# Patient Record
Sex: MALE | Race: WHITE | NOT HISPANIC OR LATINO | Employment: FULL TIME | URBAN - METROPOLITAN AREA
[De-identification: names, ages, dates, MRNs, and addresses within clinical notes are randomized per-mention and may not be internally consistent; named-entity substitution may affect disease eponyms.]

---

## 2017-11-14 ENCOUNTER — OFFICE VISIT (OUTPATIENT)
Dept: INTERNAL MEDICINE | Facility: CLINIC | Age: 47
End: 2017-11-14

## 2017-11-14 VITALS
BODY MASS INDEX: 41.75 KG/M2 | WEIGHT: 315 LBS | HEIGHT: 73 IN | TEMPERATURE: 98.2 F | SYSTOLIC BLOOD PRESSURE: 148 MMHG | DIASTOLIC BLOOD PRESSURE: 82 MMHG

## 2017-11-14 DIAGNOSIS — R11.0 NAUSEA: ICD-10-CM

## 2017-11-14 DIAGNOSIS — H53.8 BLURRY VISION, BILATERAL: ICD-10-CM

## 2017-11-14 DIAGNOSIS — R42 DIZZINESS: ICD-10-CM

## 2017-11-14 DIAGNOSIS — R51.9 WORSENING HEADACHES: ICD-10-CM

## 2017-11-14 DIAGNOSIS — H47.10 PAPILLEDEMA: Primary | ICD-10-CM

## 2017-11-14 PROCEDURE — 99204 OFFICE O/P NEW MOD 45 MIN: CPT | Performed by: INTERNAL MEDICINE

## 2017-11-14 RX ORDER — LISINOPRIL 20 MG/1
20 TABLET ORAL DAILY
COMMUNITY
End: 2018-07-17

## 2017-11-14 RX ORDER — ONDANSETRON 4 MG/1
4 TABLET, ORALLY DISINTEGRATING ORAL EVERY 8 HOURS PRN
Qty: 42 TABLET | Refills: 0 | Status: SHIPPED | OUTPATIENT
Start: 2017-11-14 | End: 2018-04-18

## 2017-11-14 RX ORDER — MECLIZINE HYDROCHLORIDE 25 MG/1
25 TABLET ORAL 3 TIMES DAILY PRN
Qty: 21 TABLET | Refills: 2 | Status: SHIPPED | OUTPATIENT
Start: 2017-11-14 | End: 2018-04-18

## 2017-11-14 NOTE — PATIENT INSTRUCTIONS
Magnetic Resonance Imaging  Magnetic resonance imaging (MRI) is an imaging test that produces clear digital pictures of the inside of your body without using X-rays. The MRI scanner uses radio waves and a magnetic field to create the images. The MRI pictures may provide different details than images obtained through X-rays, CT scans, or ultrasounds. Contrast material may be injected to make MRI images even more clear. In a standard MRI scanner, the area of your body being studied will be in the center opening of the scanner. In open MRI scanners, the scanner does not entirely surround your body.   LET YOUR HEALTH CARE PROVIDER KNOW ABOUT:  · Previous surgeries you have had.  · Any metal you may have in your body. The magnet used in MRI can cause metal objects in your body to move. This includes:    A pacemaker or any other implants, such as an implanted neurostimulator, a metallic ear implant, or a metallic object within the eye socket.    Metal splinters in your body.    Any bullet fragments.    A port for delivering insulin or chemotherapy.  · Any tattoos. Some red dyes contain iron which is sometimes a problem.  · If you are pregnant or think you may be pregnant.  · If you are breastfeeding.  · If you are afraid of cramped spaces (claustrophobic). If claustrophobia is a problem, it usually can be relieved with medicines or the use of the open MRI scanner.  · Any allergies you have.  · All medicines you are taking, including vitamins, herbs, eye drops, creams, and over-the-counter medicines.  RISKS AND COMPLICATIONS   Generally, MRI is a safe procedure. However, problems can occur and include:  · If a metal implant is present but is undetected, it may be affected by the strong magnetic field. In addition, if the implant is close to the examination site, it may be hard to get high-quality images.  · If you are pregnant:    MRI generally should be avoided during the first three months of pregnancy. It is not known  what effects the MRI may have on a fetus. Ultrasound is preferred at this time unless a serious condition is suspected that is best studied by MRI. MRI should be considered if there is a substantial risk of missing the correct diagnosis if MRI is not done.  · If you are breastfeeding:    You should inform your health care provider and ask how to proceed. You may pump breast milk before the exam for use until the contrast material, if used, has cleared from the body.  BEFORE THE PROCEDURE   · You will be asked to remove all metal, including:    Your watch, jewelry, and other metal objects.    Some makeup also contains traces of metal and may need to be removed.    Braces and fillings normally are not a problem.  PROCEDURE  · You may be given earplugs or headphones to listen to music. The MRI scanner can be noisy.  · You may be injected with contrast material.  · The standard MRI is done in a long, magnetic chamber. You will lie down on a platform that slides into the magnetic chamber. Once inside, you will still be able to talk to the person performing the test. The open MRI scanner is open on at least one side of the scanner.  · You will be asked to hold very still. You will be told when you can shift position. You may have to wait a few minutes to make sure the images are readable.  AFTER THE PROCEDURE   · You may resume normal activities right away.  · If you were given contrast material, it will pass naturally through your body within a day.  · A person experienced in MRI (radiologist) will analyze the results and send a report to your health care provider, along with an explanation of the results.     This information is not intended to replace advice given to you by your health care provider. Make sure you discuss any questions you have with your health care provider.     Document Released: 12/15/2001 Document Revised: 01/08/2016 Document Reviewed: 02/12/2015  Elsevier Interactive Patient Education ©2017  [employer]

## 2017-11-14 NOTE — PROGRESS NOTES
Subjective   Catracho Sahu is a 47 y.o. male here to establish care for papilledema noted by optometry. He has also had dizziness, blurry vision R>L, headaches, nausea which have been getting progressively worse but have been present for months. He went to ED recently and no imaging was done though blood work was unrevealing. He feels most dizzy when turning while walking. He feels constantly nauseous but has not vomited. He went to optometrist for blurry vision and papilledema was noted on right eye (exam scanned to chart) and a MRI and LP was ordered but he was directed to PCP to order those.    Review of Systems   Constitutional: Negative.    Eyes: Negative.    Respiratory: Negative.    Cardiovascular: Negative.    Gastrointestinal: Positive for nausea. Negative for vomiting.   Endocrine: Negative.    Genitourinary: Negative.    Musculoskeletal: Negative.    Skin: Negative.    Allergic/Immunologic: Negative.    Neurological: Positive for dizziness, light-headedness and headaches. Negative for seizures, syncope, facial asymmetry, speech difficulty, weakness and numbness.   Hematological: Negative.    Psychiatric/Behavioral: Negative.        Past Medical History:   Diagnosis Date   • Fractures    • Hypertension      Family History   Problem Relation Age of Onset   • Migraines Mother    • Hypertension Father    • Hypertension Paternal Aunt      History reviewed. No pertinent surgical history.  Social History     Social History   • Marital status: Single     Spouse name: N/A   • Number of children: N/A   • Years of education: N/A     Occupational History   • Not on file.     Social History Main Topics   • Smoking status: Never Smoker   • Smokeless tobacco: Never Used   • Alcohol use No   • Drug use: No   • Sexual activity: Not on file     Other Topics Concern   • Not on file     Social History Narrative   • No narrative on file         Current Outpatient Prescriptions:   •  lisinopril (PRINIVIL,ZESTRIL) 20 MG  "tablet, Take 20 mg by mouth Daily., Disp: , Rfl:     Objective   /82 (BP Location: Right arm, Patient Position: Sitting, Cuff Size: Large Adult)  Temp 98.2 °F (36.8 °C) (Temporal Artery )   Ht 73\" (185.4 cm)  Wt (!) 472 lb 12.8 oz (214 kg)  BMI 62.38 kg/m2  Physical Exam   Constitutional: He is oriented to person, place, and time. He appears well-developed and well-nourished.   HENT:   Head: Normocephalic and atraumatic.   Nose: Nose normal.   Eyes: Conjunctivae are normal. Pupils are equal, round, and reactive to light.   Cardiovascular: Normal rate, regular rhythm and normal heart sounds.  Exam reveals no gallop and no friction rub.    No murmur heard.  Pulmonary/Chest: Effort normal and breath sounds normal. He has no wheezes.   Musculoskeletal:   Normal gait and station   Neurological: He is alert and oriented to person, place, and time. He has normal strength. No cranial nerve deficit or sensory deficit. Gait normal.   Skin: Skin is warm and dry.   Psychiatric: He has a normal mood and affect. His behavior is normal. Judgment and thought content normal.   Vitals reviewed.      Assessment/Plan   Catracho was seen today for establish care for papilledema. His sx are a chronic slowly worsening problem and he has no red flag sx that require emergent MRI, though I did order it as STAT (see below).    Diagnoses and all orders for this visit:    Papilledema  -noted on exam by optometry  -ddx increased intracranial pressure, pseudotumor cerebri, get MRI to evaluate. The MRI is problematic due to pt's size. His waist circumference limits where he can go and although MRI was ordered as STAT (wanted this week) it cannot be done until next week at a place in town and may not be able to to be done at all. CT was also considered but same problem. This MRI is first available. Told pt if blurry vision, nausea, dizziness, HA gets worse to go to ED as that is very concerning, at least while we are waiting on " MRI    Blurry vision, bilateral  -     MRI Brain With & Without Contrast    Worsening headaches  -     MRI Brain With & Without Contrast    Nausea  -     MRI Brain With & Without Contrast  -     ondansetron ODT (ZOFRAN ODT) 4 MG disintegrating tablet; Take 1 tablet by mouth Every 8 (Eight) Hours As Needed for Nausea or Vomiting.    Dizziness  -     MRI Brain With & Without Contrast  -     meclizine (ANTIVERT) 25 MG tablet; Take 1 tablet by mouth 3 (Three) Times a Day As Needed for dizziness.

## 2017-11-21 ENCOUNTER — TELEPHONE (OUTPATIENT)
Dept: INTERNAL MEDICINE | Facility: CLINIC | Age: 47
End: 2017-11-21

## 2017-11-22 ENCOUNTER — TELEPHONE (OUTPATIENT)
Dept: INTERNAL MEDICINE | Facility: CLINIC | Age: 47
End: 2017-11-22

## 2017-11-22 DIAGNOSIS — G93.5 CHIARI MALFORMATION TYPE I (HCC): Primary | ICD-10-CM

## 2017-11-22 DIAGNOSIS — R42 DIZZINESS: ICD-10-CM

## 2017-11-22 NOTE — TELEPHONE ENCOUNTER
Spoke to patient. He stated he went to [facility] on the 20th. I called [facility] and the report is in [facility]. The have contacted their reading department to have them release the pre alegria so they can fax it to Dr. Tan.     Taqueria advising patient. nh

## 2017-11-27 ENCOUNTER — TELEPHONE (OUTPATIENT)
Dept: INTERNAL MEDICINE | Facility: CLINIC | Age: 47
End: 2017-11-27

## 2017-11-27 NOTE — TELEPHONE ENCOUNTER
Pt is calling just to verify we received the final report of his MRI and if there were any changes from the preliminary. Please call pt and advise. Pt said if no answer okay to leave a detailed message.

## 2018-04-18 ENCOUNTER — DOCUMENTATION (OUTPATIENT)
Dept: BARIATRICS/WEIGHT MGMT | Facility: CLINIC | Age: 48
End: 2018-04-18

## 2018-04-18 DIAGNOSIS — R10.13 DYSPEPSIA: ICD-10-CM

## 2018-04-18 DIAGNOSIS — R06.00 DYSPNEA, UNSPECIFIED TYPE: Primary | ICD-10-CM

## 2018-04-18 DIAGNOSIS — R53.83 FATIGUE, UNSPECIFIED TYPE: ICD-10-CM

## 2018-04-18 RX ORDER — MULTIPLE VITAMINS W/ MINERALS TAB 9MG-400MCG
1 TAB ORAL DAILY
COMMUNITY
End: 2019-05-22

## 2018-04-27 ENCOUNTER — HOSPITAL ENCOUNTER (EMERGENCY)
Facility: HOSPITAL | Age: 48
Discharge: HOME | End: 2018-04-27
Attending: EMERGENCY MEDICINE | Admitting: EMERGENCY MEDICINE

## 2018-04-27 ENCOUNTER — APPOINTMENT (OUTPATIENT)
Dept: GENERAL RADIOLOGY | Facility: HOSPITAL | Age: 48
End: 2018-04-27

## 2018-04-27 ENCOUNTER — APPOINTMENT (OUTPATIENT)
Dept: CT IMAGING | Facility: HOSPITAL | Age: 48
End: 2018-04-27

## 2018-04-27 VITALS
HEIGHT: 73 IN | TEMPERATURE: 98.6 F | HEART RATE: 89 BPM | WEIGHT: 315 LBS | DIASTOLIC BLOOD PRESSURE: 81 MMHG | SYSTOLIC BLOOD PRESSURE: 127 MMHG | BODY MASS INDEX: 41.75 KG/M2 | RESPIRATION RATE: 16 BRPM | OXYGEN SATURATION: 93 %

## 2018-04-27 DIAGNOSIS — R10.9 LEFT FLANK PAIN: Primary | ICD-10-CM

## 2018-04-27 DIAGNOSIS — M75.92 SUPRASPINATUS TENDINITIS, LEFT: ICD-10-CM

## 2018-04-27 LAB
ALBUMIN SERPL-MCNC: 4.4 G/DL (ref 3.2–4.8)
ALBUMIN/GLOB SERPL: 1.4 G/DL (ref 1.5–2.5)
ALP SERPL-CCNC: 59 U/L (ref 25–100)
ALT SERPL W P-5'-P-CCNC: 38 U/L (ref 7–40)
ANION GAP SERPL CALCULATED.3IONS-SCNC: 8 MMOL/L (ref 3–11)
AST SERPL-CCNC: 32 U/L (ref 0–33)
BACTERIA UR QL AUTO: NORMAL /HPF
BASOPHILS # BLD AUTO: 0.05 10*3/MM3 (ref 0–0.2)
BASOPHILS NFR BLD AUTO: 0.7 % (ref 0–1)
BILIRUB SERPL-MCNC: 0.8 MG/DL (ref 0.3–1.2)
BILIRUB UR QL STRIP: NEGATIVE
BUN BLD-MCNC: 16 MG/DL (ref 9–23)
BUN/CREAT SERPL: 12.3 (ref 7–25)
CALCIUM SPEC-SCNC: 9.2 MG/DL (ref 8.7–10.4)
CHLORIDE SERPL-SCNC: 103 MMOL/L (ref 99–109)
CLARITY UR: CLEAR
CO2 SERPL-SCNC: 27 MMOL/L (ref 20–31)
COLOR UR: YELLOW
CREAT BLD-MCNC: 1.3 MG/DL (ref 0.6–1.3)
DEPRECATED RDW RBC AUTO: 41.9 FL (ref 37–54)
EOSINOPHIL # BLD AUTO: 0.17 10*3/MM3 (ref 0–0.3)
EOSINOPHIL NFR BLD AUTO: 2.5 % (ref 0–3)
ERYTHROCYTE [DISTWIDTH] IN BLOOD BY AUTOMATED COUNT: 13.6 % (ref 11.3–14.5)
GFR SERPL CREATININE-BSD FRML MDRD: 60 ML/MIN/1.73
GLOBULIN UR ELPH-MCNC: 3.2 GM/DL
GLUCOSE BLD-MCNC: 90 MG/DL (ref 70–100)
GLUCOSE UR STRIP-MCNC: NEGATIVE MG/DL
HCT VFR BLD AUTO: 47.1 % (ref 38.9–50.9)
HGB BLD-MCNC: 15.7 G/DL (ref 13.1–17.5)
HGB UR QL STRIP.AUTO: ABNORMAL
HYALINE CASTS UR QL AUTO: NORMAL /LPF
IMM GRANULOCYTES # BLD: 0.01 10*3/MM3 (ref 0–0.03)
IMM GRANULOCYTES NFR BLD: 0.1 % (ref 0–0.6)
KETONES UR QL STRIP: ABNORMAL
LEUKOCYTE ESTERASE UR QL STRIP.AUTO: NEGATIVE
LIPASE SERPL-CCNC: 28 U/L (ref 6–51)
LYMPHOCYTES # BLD AUTO: 1.9 10*3/MM3 (ref 0.6–4.8)
LYMPHOCYTES NFR BLD AUTO: 27.8 % (ref 24–44)
MCH RBC QN AUTO: 28.4 PG (ref 27–31)
MCHC RBC AUTO-ENTMCNC: 33.3 G/DL (ref 32–36)
MCV RBC AUTO: 85.2 FL (ref 80–99)
MONOCYTES # BLD AUTO: 0.45 10*3/MM3 (ref 0–1)
MONOCYTES NFR BLD AUTO: 6.6 % (ref 0–12)
NEUTROPHILS # BLD AUTO: 4.26 10*3/MM3 (ref 1.5–8.3)
NEUTROPHILS NFR BLD AUTO: 62.4 % (ref 41–71)
NITRITE UR QL STRIP: NEGATIVE
PH UR STRIP.AUTO: <=5 [PH] (ref 5–8)
PLATELET # BLD AUTO: 195 10*3/MM3 (ref 150–450)
PMV BLD AUTO: 10.2 FL (ref 6–12)
POTASSIUM BLD-SCNC: 4 MMOL/L (ref 3.5–5.5)
PROT SERPL-MCNC: 7.6 G/DL (ref 5.7–8.2)
PROT UR QL STRIP: NEGATIVE
RBC # BLD AUTO: 5.53 10*6/MM3 (ref 4.2–5.76)
RBC # UR: NORMAL /HPF
REF LAB TEST METHOD: NORMAL
SODIUM BLD-SCNC: 138 MMOL/L (ref 132–146)
SP GR UR STRIP: 1.02 (ref 1–1.03)
SQUAMOUS #/AREA URNS HPF: NORMAL /HPF
UROBILINOGEN UR QL STRIP: ABNORMAL
WBC NRBC COR # BLD: 6.83 10*3/MM3 (ref 3.5–10.8)
WBC UR QL AUTO: NORMAL /HPF

## 2018-04-27 PROCEDURE — 74176 CT ABD & PELVIS W/O CONTRAST: CPT

## 2018-04-27 PROCEDURE — 81001 URINALYSIS AUTO W/SCOPE: CPT | Performed by: PHYSICIAN ASSISTANT

## 2018-04-27 PROCEDURE — 73030 X-RAY EXAM OF SHOULDER: CPT

## 2018-04-27 PROCEDURE — 83690 ASSAY OF LIPASE: CPT | Performed by: PHYSICIAN ASSISTANT

## 2018-04-27 PROCEDURE — 80053 COMPREHEN METABOLIC PANEL: CPT | Performed by: PHYSICIAN ASSISTANT

## 2018-04-27 PROCEDURE — 99283 EMERGENCY DEPT VISIT LOW MDM: CPT

## 2018-04-27 PROCEDURE — 85025 COMPLETE CBC W/AUTO DIFF WBC: CPT | Performed by: PHYSICIAN ASSISTANT

## 2018-04-27 RX ORDER — DICLOFENAC POTASSIUM 50 MG/1
50 TABLET, FILM COATED ORAL 3 TIMES DAILY
Qty: 15 TABLET | Refills: 0 | Status: SHIPPED | OUTPATIENT
Start: 2018-04-27 | End: 2018-07-12

## 2018-06-13 DIAGNOSIS — R10.13 DYSPEPSIA: ICD-10-CM

## 2018-06-13 DIAGNOSIS — R06.00 DYSPNEA, UNSPECIFIED TYPE: ICD-10-CM

## 2018-06-13 DIAGNOSIS — R53.83 FATIGUE, UNSPECIFIED TYPE: ICD-10-CM

## 2018-07-12 ENCOUNTER — HOSPITAL ENCOUNTER (EMERGENCY)
Facility: HOSPITAL | Age: 48
Discharge: HOME | End: 2018-07-12
Attending: EMERGENCY MEDICINE | Admitting: EMERGENCY MEDICINE

## 2018-07-12 ENCOUNTER — APPOINTMENT (OUTPATIENT)
Dept: GENERAL RADIOLOGY | Facility: HOSPITAL | Age: 48
End: 2018-07-12

## 2018-07-12 VITALS
DIASTOLIC BLOOD PRESSURE: 88 MMHG | SYSTOLIC BLOOD PRESSURE: 144 MMHG | BODY MASS INDEX: 42.66 KG/M2 | HEIGHT: 72 IN | OXYGEN SATURATION: 95 % | HEART RATE: 80 BPM | RESPIRATION RATE: 18 BRPM | TEMPERATURE: 98.5 F | WEIGHT: 315 LBS

## 2018-07-12 DIAGNOSIS — M77.31 HEEL SPUR, RIGHT: ICD-10-CM

## 2018-07-12 DIAGNOSIS — M72.2 PLANTAR FASCIITIS OF RIGHT FOOT: Primary | ICD-10-CM

## 2018-07-12 DIAGNOSIS — I10 UNCONTROLLED HYPERTENSION: ICD-10-CM

## 2018-07-12 PROCEDURE — 73610 X-RAY EXAM OF ANKLE: CPT

## 2018-07-12 PROCEDURE — 99283 EMERGENCY DEPT VISIT LOW MDM: CPT

## 2018-07-12 RX ORDER — NAPROXEN 375 MG/1
375 TABLET ORAL 2 TIMES DAILY PRN
Qty: 14 TABLET | Refills: 0 | Status: SHIPPED | OUTPATIENT
Start: 2018-07-12 | End: 2019-03-19

## 2018-07-13 NOTE — DISCHARGE INSTRUCTIONS
Limit weight bearing for three full days.  Work excuse provided to facilitate this.  Cool compresses to your feet.  See a podiatrist about potential shoe inserts.  Until then, insert purchased arch supports.  Antiinflammatories as directed for comfort.  Thank you

## 2018-07-13 NOTE — ED PROVIDER NOTES
Subjective   Patient presents with a complaint of pain to his right inner foot going toward the ankle.  He discovered some discoloration there recently.  He recently began to perform at a job with more ambulation and standing.  He is using shoes without sufficient arch support as well        History provided by:  Patient   used: No    Lower Extremity Issue   Location:  Foot  Injury: no    Foot location:  R foot  Pain details:     Quality:  Aching    Severity:  Mild    Onset quality:  Gradual    Timing:  Intermittent  Chronicity:  New  Dislocation: no    Prior injury to area:  No  Relieved by:  Elevation  Worsened by:  Bearing weight  Ineffective treatments:  None tried  Associated symptoms: swelling    Associated symptoms: no back pain, no numbness and no tingling    Risk factors: obesity        Review of Systems   Musculoskeletal: Negative for back pain.        Superficial variscosities at the inner foot and ankle are broken and tender on the inner portions of both feet.    All other systems reviewed and are negative.      Past Medical History:   Diagnosis Date   • Fractures    • Hypertension    • Sleep apnea        No Known Allergies    Past Surgical History:   Procedure Laterality Date   • COLONOSCOPY  2008   • TONSILLECTOMY  2002       Family History   Problem Relation Age of Onset   • Migraines Mother    • Hypertension Father    • Stroke Father    • Hypertension Paternal Aunt    • Cancer Maternal Grandmother    • Parkinsonism Maternal Grandfather    • Heart attack Paternal Grandmother    • Hypertension Paternal Grandmother    • Dementia Paternal Grandfather    • Stroke Paternal Grandfather        Social History     Social History   • Marital status: Single     Social History Main Topics   • Smoking status: Never Smoker   • Smokeless tobacco: Never Used   • Alcohol use No   • Drug use: No   • Sexual activity: Defer     Other Topics Concern   • Not on file           Objective   Physical Exam    Constitutional: He appears well-developed and well-nourished. No distress.   HENT:   Head: Normocephalic and atraumatic.   Eyes: Pupils are equal, round, and reactive to light.   Neck: Neck supple.   Cardiovascular: Normal rate.    Pulmonary/Chest: Effort normal and breath sounds normal. No respiratory distress.   Musculoskeletal:   Right foot:  There are multiple superficial variscosities at the plantar region of the inner foot that are ruptured with local disccoloration and ecchymosis extending toward the ankle but not quite.  Patient localizes tenderness.  Further inquiry reveals the same manifestation on the opposite but nontender foot.  NV exam is otherwise negative.     Neurological: No sensory deficit. Coordination normal.   Skin: Capillary refill takes less than 2 seconds. No rash noted. He is not diaphoretic. No erythema. No pallor.   Psychiatric: He has a normal mood and affect. His behavior is normal. Judgment and thought content normal.       Procedures           ED Course                  MDM      Final diagnoses:   Plantar fasciitis of right foot   Uncontrolled hypertension   Heel spur, right            Callie Vijaya Thornton, APRN  07/16/18 6092

## 2018-07-17 ENCOUNTER — OFFICE VISIT (OUTPATIENT)
Dept: INTERNAL MEDICINE | Facility: CLINIC | Age: 48
End: 2018-07-17

## 2018-07-17 VITALS
TEMPERATURE: 97.3 F | DIASTOLIC BLOOD PRESSURE: 110 MMHG | BODY MASS INDEX: 42.66 KG/M2 | SYSTOLIC BLOOD PRESSURE: 164 MMHG | WEIGHT: 315 LBS | HEIGHT: 72 IN

## 2018-07-17 DIAGNOSIS — M54.50 CHRONIC LEFT-SIDED LOW BACK PAIN WITHOUT SCIATICA: ICD-10-CM

## 2018-07-17 DIAGNOSIS — Z00.00 HEALTH CARE MAINTENANCE: ICD-10-CM

## 2018-07-17 DIAGNOSIS — I10 BENIGN ESSENTIAL HTN: Primary | ICD-10-CM

## 2018-07-17 DIAGNOSIS — R35.89 POLYURIA: ICD-10-CM

## 2018-07-17 DIAGNOSIS — G89.29 CHRONIC LEFT-SIDED LOW BACK PAIN WITHOUT SCIATICA: ICD-10-CM

## 2018-07-17 LAB
ALBUMIN SERPL-MCNC: 3.99 G/DL (ref 3.2–4.8)
ALBUMIN/GLOB SERPL: 1.5 G/DL (ref 1.5–2.5)
ALP SERPL-CCNC: 58 U/L (ref 25–100)
ALT SERPL W P-5'-P-CCNC: 28 U/L (ref 7–40)
ANION GAP SERPL CALCULATED.3IONS-SCNC: 6 MMOL/L (ref 3–11)
ARTICHOKE IGE QN: 91 MG/DL (ref 0–130)
AST SERPL-CCNC: 24 U/L (ref 0–33)
BILIRUB SERPL-MCNC: 0.7 MG/DL (ref 0.3–1.2)
BUN BLD-MCNC: 13 MG/DL (ref 9–23)
BUN/CREAT SERPL: 10.7 (ref 7–25)
CALCIUM SPEC-SCNC: 8.5 MG/DL (ref 8.7–10.4)
CHLORIDE SERPL-SCNC: 108 MMOL/L (ref 99–109)
CHOLEST SERPL-MCNC: 145 MG/DL (ref 0–200)
CO2 SERPL-SCNC: 27 MMOL/L (ref 20–31)
CREAT BLD-MCNC: 1.22 MG/DL (ref 0.6–1.3)
GFR SERPL CREATININE-BSD FRML MDRD: 64 ML/MIN/1.73
GLOBULIN UR ELPH-MCNC: 2.7 GM/DL
GLUCOSE BLD-MCNC: 99 MG/DL (ref 70–100)
HBA1C MFR BLD: 5.7 % (ref 4.8–5.6)
HDLC SERPL-MCNC: 46 MG/DL (ref 40–60)
POTASSIUM BLD-SCNC: 4.1 MMOL/L (ref 3.5–5.5)
PROT SERPL-MCNC: 6.7 G/DL (ref 5.7–8.2)
SODIUM BLD-SCNC: 141 MMOL/L (ref 132–146)
TRIGL SERPL-MCNC: 96 MG/DL (ref 0–150)

## 2018-07-17 PROCEDURE — 83036 HEMOGLOBIN GLYCOSYLATED A1C: CPT | Performed by: INTERNAL MEDICINE

## 2018-07-17 PROCEDURE — 99214 OFFICE O/P EST MOD 30 MIN: CPT | Performed by: INTERNAL MEDICINE

## 2018-07-17 PROCEDURE — 80061 LIPID PANEL: CPT | Performed by: INTERNAL MEDICINE

## 2018-07-17 PROCEDURE — 36415 COLL VENOUS BLD VENIPUNCTURE: CPT | Performed by: INTERNAL MEDICINE

## 2018-07-17 PROCEDURE — 80053 COMPREHEN METABOLIC PANEL: CPT | Performed by: INTERNAL MEDICINE

## 2018-07-17 RX ORDER — LISINOPRIL 40 MG/1
40 TABLET ORAL DAILY
Qty: 30 TABLET | Refills: 5 | Status: SHIPPED | OUTPATIENT
Start: 2018-07-17 | End: 2020-12-23 | Stop reason: SDUPTHER

## 2018-07-17 NOTE — PROGRESS NOTES
"Subjective   Catracho Sahu is a 47 y.o. male here for follow-up recent ED visit for uncontrolled HTN and foot pain. Was told BP was very high and he admits running out of lisinopril. They told him he has plantar fasciitis and there is some concern for poor circulation or diabetes. He does endorse polyuria and polydipsia. Never had DM before. Has some left sided low back pain and spasms for years now. Especially bad if lying on stomach and tries to get up from there, feels stabbing.       The following portions of the patient's history were reviewed and updated as appropriate: allergies, current medications, past medical history, past social history and problem list.    Review of Systems:  General: negative  CV: negative  Respiratory: negative  Neuro: negative  Endo: polyuria, polydipsia  MSK: back pain    Objective   BP (!) 164/110 (BP Location: Left arm, Patient Position: Sitting, Cuff Size: Large Adult)   Temp 97.3 °F (36.3 °C) (Temporal Artery )   Ht 182.9 cm (72\")   Wt (!) 210 kg (463 lb)   BMI 62.79 kg/m²     Physical Exam   Constitutional: He is oriented to person, place, and time. He appears well-developed and well-nourished.   Cardiovascular: Normal rate, regular rhythm and normal heart sounds.    Pulmonary/Chest: Effort normal and breath sounds normal. He has no wheezes. He has no rales.   Musculoskeletal:   Non-pitting edema in feet bilaterally   Neurological: He is alert and oriented to person, place, and time.   Skin: Skin is warm and dry.   Psychiatric: He has a normal mood and affect. His behavior is normal. Thought content normal.   Vitals reviewed.      Assessment/Plan   Catracho was seen today for hypertension.    Diagnoses and all orders for this visit:    Benign essential HTN  -     lisinopril (PRINIVIL,ZESTRIL) 40 MG tablet; Take 1 tablet by mouth Daily.  -     Increased dose, will RTC 1 week for BP check and go over labs    Chronic left-sided back pain  -likely related to inactivity, " obesity, and probable arthritis  -rec stretching exercises, losing weight    Polyuria  -     Hemoglobin A1c    Health care maintenance  -     Hemoglobin A1c  -     Comprehensive Metabolic Panel  -     Lipid Panel

## 2018-07-17 NOTE — PATIENT INSTRUCTIONS

## 2018-07-27 ENCOUNTER — OFFICE VISIT (OUTPATIENT)
Dept: INTERNAL MEDICINE | Facility: CLINIC | Age: 48
End: 2018-07-27

## 2018-07-27 VITALS
DIASTOLIC BLOOD PRESSURE: 100 MMHG | SYSTOLIC BLOOD PRESSURE: 162 MMHG | BODY MASS INDEX: 42.66 KG/M2 | TEMPERATURE: 97.3 F | HEIGHT: 72 IN | WEIGHT: 315 LBS

## 2018-07-27 DIAGNOSIS — M72.2 PLANTAR FASCIITIS: ICD-10-CM

## 2018-07-27 DIAGNOSIS — E66.01 MORBID OBESITY WITH BMI OF 60.0-69.9, ADULT (HCC): ICD-10-CM

## 2018-07-27 DIAGNOSIS — R73.03 PRE-DIABETES: ICD-10-CM

## 2018-07-27 DIAGNOSIS — I10 BENIGN ESSENTIAL HTN: Primary | ICD-10-CM

## 2018-07-27 PROCEDURE — 99214 OFFICE O/P EST MOD 30 MIN: CPT | Performed by: INTERNAL MEDICINE

## 2018-07-27 RX ORDER — HYDROCHLOROTHIAZIDE 25 MG/1
25 TABLET ORAL DAILY
Qty: 30 TABLET | Refills: 2 | OUTPATIENT
Start: 2018-07-27 | End: 2019-05-22

## 2018-07-27 NOTE — PROGRESS NOTES
"Subjective   Catracho Sahu is a 47 y.o. male here for follow-up HTN, newly diagnosed pre-DM, obesity, plantar fasciitis. HTN: taking lisinopril as prescribed, doesn't take BP at home. Does feel anxious today to go over labs. Pre-DM: new diagnosis. Never had DM before. Denies any sx of DM. Doesn't eat that many sweets and mainly drinks crystal light or diet Coke. Plantar fasciitis: in the right foot, saw specialist. Bought some compression socks which have been helping.      The following portions of the patient's history were reviewed and updated as appropriate: allergies, current medications, past medical history, past social history and problem list.    Review of Systems:  General: negative  CV: negative  Respiratory: negative  Endo: negative  MSK: b/l foot pain    Objective   /100 (BP Location: Left arm, Patient Position: Sitting, Cuff Size: Large Adult)   Temp 97.3 °F (36.3 °C) (Temporal Artery )   Ht 182.9 cm (72\")   Wt (!) 208 kg (458 lb 6.4 oz)   BMI 62.17 kg/m²     Physical Exam   Constitutional: He is oriented to person, place, and time. He appears well-developed and well-nourished.   Pulmonary/Chest: Effort normal.   Neurological: He is alert and oriented to person, place, and time.   Skin: Skin is warm and dry.   Psychiatric: He has a normal mood and affect. His behavior is normal. Judgment and thought content normal.   Vitals reviewed.      Assessment/Plan   Catracho was seen today for hypertension.    Diagnoses and all orders for this visit:    Benign essential HTN  -     hydrochlorothiazide (HYDRODIURIL) 25 MG tablet; Take 1 tablet by mouth Daily.  -     Add-on to lisinopril  -     Encouraged weight loss    Pre-diabetes  -discussed diet, limiting carbs, sugary drinks, concentrated sweets to prevent diabetes    Morbid obesity with BMI of 60.0-69.9, adult (CMS/[facility])  -complicates all aspects of care    Plantar fasciitis  -seeing specialist         "

## 2018-08-21 ENCOUNTER — TELEPHONE (OUTPATIENT)
Dept: INTERNAL MEDICINE | Facility: CLINIC | Age: 48
End: 2018-08-21

## 2018-08-21 NOTE — TELEPHONE ENCOUNTER
"Patient sts he is taking his bp meds and the \"water pill\" however his bp is running 185/105. Asking if you want to see him or what you suggest?  "

## 2018-08-22 NOTE — TELEPHONE ENCOUNTER
Please call the patient to schedule to come in for blood pressure. (see below) Also ask him to bring his cuff in so we can compare.

## 2018-08-22 NOTE — TELEPHONE ENCOUNTER
He needs to come in for a BP check to compare his cuff to ours. I just started HCTZ and his BP is higher than before I did that so I'm thinking his cuff is wrong. Probably doesn't have the right size for his arm girth.

## 2018-10-03 ENCOUNTER — APPOINTMENT (OUTPATIENT)
Dept: GENERAL RADIOLOGY | Facility: HOSPITAL | Age: 48
End: 2018-10-03

## 2018-10-03 ENCOUNTER — HOSPITAL ENCOUNTER (EMERGENCY)
Facility: HOSPITAL | Age: 48
Discharge: HOME | End: 2018-10-03
Attending: EMERGENCY MEDICINE | Admitting: EMERGENCY MEDICINE

## 2018-10-03 VITALS
WEIGHT: 315 LBS | DIASTOLIC BLOOD PRESSURE: 79 MMHG | OXYGEN SATURATION: 94 % | BODY MASS INDEX: 41.75 KG/M2 | TEMPERATURE: 98.2 F | SYSTOLIC BLOOD PRESSURE: 139 MMHG | HEART RATE: 87 BPM | HEIGHT: 73 IN | RESPIRATION RATE: 18 BRPM

## 2018-10-03 DIAGNOSIS — M25.511 ACUTE PAIN OF RIGHT SHOULDER: ICD-10-CM

## 2018-10-03 DIAGNOSIS — S46.001A INJURY OF RIGHT ROTATOR CUFF, INITIAL ENCOUNTER: Primary | ICD-10-CM

## 2018-10-03 PROCEDURE — 73030 X-RAY EXAM OF SHOULDER: CPT

## 2018-10-03 PROCEDURE — 25010000002 KETOROLAC TROMETHAMINE PER 15 MG: Performed by: NURSE PRACTITIONER

## 2018-10-03 PROCEDURE — 99283 EMERGENCY DEPT VISIT LOW MDM: CPT

## 2018-10-03 PROCEDURE — 96372 THER/PROPH/DIAG INJ SC/IM: CPT

## 2018-10-03 RX ORDER — KETOROLAC TROMETHAMINE 30 MG/ML
30 INJECTION, SOLUTION INTRAMUSCULAR; INTRAVENOUS ONCE
Status: COMPLETED | OUTPATIENT
Start: 2018-10-03 | End: 2018-10-03

## 2018-10-03 RX ORDER — HYDROCODONE BITARTRATE AND ACETAMINOPHEN 5; 325 MG/1; MG/1
1-2 TABLET ORAL EVERY 6 HOURS PRN
Qty: 12 TABLET | Refills: 0 | Status: SHIPPED | OUTPATIENT
Start: 2018-10-03 | End: 2019-03-19

## 2018-10-03 RX ADMIN — KETOROLAC TROMETHAMINE 30 MG: 30 INJECTION, SOLUTION INTRAMUSCULAR; INTRAVENOUS at 15:57

## 2018-10-03 NOTE — ED PROVIDER NOTES
Subjective   Right shoulder pain. Worse with movement. No trauma.     No soa. No cp. No fever.    No abd pain.    Rest makes it better.        Upper Extremity Issue   Location:  Shoulder  Shoulder location:  R shoulder  Injury: no    Pain details:     Quality:  Tearing    Radiates to:  Does not radiate    Severity:  Moderate    Onset quality:  Sudden    Timing:  Constant    Progression:  Unchanged  Handedness:  Right-handed  Dislocation: no    Foreign body present:  No foreign bodies  Prior injury to area:  Yes  Relieved by:  Rest  Worsened by:  Movement  Ineffective treatments:  None tried  Associated symptoms: decreased range of motion    Associated symptoms: no fever, no numbness and no swelling        Review of Systems   Constitutional: Negative for fever.   Respiratory: Negative for shortness of breath.    Cardiovascular: Negative for chest pain.   Gastrointestinal: Negative for abdominal pain.   Musculoskeletal: Negative for neck stiffness.   Neurological: Negative for dizziness, seizures, syncope, speech difficulty, weakness, light-headedness, numbness and headaches.   Psychiatric/Behavioral: Negative for confusion.   All other systems reviewed and are negative.      Past Medical History:   Diagnosis Date   • Fractures    • Hypertension    • Sleep apnea        No Known Allergies    Past Surgical History:   Procedure Laterality Date   • COLONOSCOPY  2008   • TONSILLECTOMY  2002       Family History   Problem Relation Age of Onset   • Migraines Mother    • Hypertension Father    • Stroke Father    • Hypertension Paternal Aunt    • Cancer Maternal Grandmother    • Parkinsonism Maternal Grandfather    • Heart attack Paternal Grandmother    • Hypertension Paternal Grandmother    • Dementia Paternal Grandfather    • Stroke Paternal Grandfather        Social History     Social History   • Marital status:      Social History Main Topics   • Smoking status: Never Smoker   • Smokeless tobacco: Never Used   •  Alcohol use No   • Drug use: No   • Sexual activity: Defer     Other Topics Concern   • Not on file           Objective   Physical Exam   Constitutional: He is oriented to person, place, and time. He appears well-developed and well-nourished.   HENT:   Head: Normocephalic and atraumatic.   Right Ear: External ear normal.   Left Ear: External ear normal.   Nose: Nose normal.   Mouth/Throat: Oropharynx is clear and moist.   Eyes: Pupils are equal, round, and reactive to light. Conjunctivae and EOM are normal.   Neck: Normal range of motion. Neck supple.   Cardiovascular: Normal rate, regular rhythm, normal heart sounds and intact distal pulses.    Pulmonary/Chest: Effort normal and breath sounds normal.   Abdominal: Soft. Bowel sounds are normal.   Musculoskeletal:        Right shoulder: He exhibits decreased range of motion, tenderness and pain. He exhibits no deformity, no spasm, normal pulse and normal strength.   Neurological: He is alert and oriented to person, place, and time.   Skin: Skin is warm and dry.   Psychiatric: He has a normal mood and affect. His behavior is normal. Judgment normal.       Procedures           ED Course        XR Shoulder 2+ View Right   Preliminary Result   Normal examination.       DICTATED:   10/03/2018   EDITED/ls :   10/03/2018                           [facility]      Final diagnoses:   Injury of right rotator cuff, initial encounter   Acute pain of right shoulder            Jonny Chung, FÉLIX  10/03/18 1537

## 2018-11-05 ENCOUNTER — HOSPITAL ENCOUNTER (OUTPATIENT)
Dept: GENERAL RADIOLOGY | Facility: HOSPITAL | Age: 48
Discharge: HOME | End: 2018-11-05
Admitting: INTERNAL MEDICINE

## 2018-11-05 ENCOUNTER — OFFICE VISIT (OUTPATIENT)
Dept: INTERNAL MEDICINE | Facility: CLINIC | Age: 48
End: 2018-11-05

## 2018-11-05 VITALS — BODY MASS INDEX: 42.66 KG/M2 | WEIGHT: 315 LBS | HEIGHT: 72 IN | TEMPERATURE: 97.6 F

## 2018-11-05 DIAGNOSIS — J06.9 ACUTE URI: Primary | ICD-10-CM

## 2018-11-05 LAB
EXPIRATION DATE: NORMAL
FLUAV AG NPH QL: NORMAL
FLUBV AG NPH QL: NORMAL
INTERNAL CONTROL: NORMAL
Lab: NORMAL

## 2018-11-05 PROCEDURE — 87804 INFLUENZA ASSAY W/OPTIC: CPT | Performed by: INTERNAL MEDICINE

## 2018-11-05 PROCEDURE — 99213 OFFICE O/P EST LOW 20 MIN: CPT | Performed by: INTERNAL MEDICINE

## 2018-11-05 PROCEDURE — 71046 X-RAY EXAM CHEST 2 VIEWS: CPT

## 2018-11-05 NOTE — PROGRESS NOTES
"Subjective   Catracho Sahu is a 48 y.o. male here for cough and SOA for 2 days. Chest feels heavy and he is having more SALEH than normal. Dry cough. Body aches as well though no fever, chills. Feels overall ill. No sick contacts.    PMH: reviewed  Meds: reviewed    Review of Systems   Constitutional: Positive for activity change and fatigue. Negative for chills and fever.   HENT: Positive for congestion, postnasal drip, sinus pressure and sore throat. Negative for ear discharge, ear pain, rhinorrhea and sneezing.    Respiratory: Positive for cough and shortness of breath. Negative for wheezing.    Gastrointestinal: Negative.    Musculoskeletal: Positive for myalgias.   Skin: Negative.          Objective   Temp 97.6 °F (36.4 °C) (Temporal Artery )   Ht 182.9 cm (72\")   Wt (!) 207 kg (456 lb 3.2 oz)   BMI 61.87 kg/m²     Physical Exam   Constitutional: He appears well-developed and well-nourished.   HENT:   Nose: Rhinorrhea present. No mucosal edema. Right sinus exhibits no maxillary sinus tenderness and no frontal sinus tenderness. Left sinus exhibits no maxillary sinus tenderness and no frontal sinus tenderness.   Mouth/Throat: Oropharynx is clear and moist and mucous membranes are normal. No oropharyngeal exudate, posterior oropharyngeal edema or posterior oropharyngeal erythema.   Neck: Neck supple.   Pulmonary/Chest: Effort normal and breath sounds normal. No respiratory distress. He has no wheezes.   Lymphadenopathy:     He has no cervical adenopathy.   Skin: Skin is warm and dry.   Vitals reviewed.      Assessment/Plan   Catracho was seen today for generalized body aches.    Diagnoses and all orders for this visit:    Acute URI  -     XR Chest PA & Lateral  -     POC Influenza A / B: negative           "

## 2018-11-06 ENCOUNTER — TELEPHONE (OUTPATIENT)
Dept: INTERNAL MEDICINE | Facility: CLINIC | Age: 48
End: 2018-11-06

## 2018-11-06 NOTE — TELEPHONE ENCOUNTER
----- Message from Vandana Tan MD sent at 11/5/2018  4:28 PM EST -----  Please call the patient tell him CXR was normal, no pneumonia. I am guessing he just has a respiratory virus. If he gets worse by end of week let me know.

## 2019-03-19 ENCOUNTER — OFFICE VISIT (OUTPATIENT)
Dept: INTERNAL MEDICINE | Facility: CLINIC | Age: 49
End: 2019-03-19

## 2019-03-19 VITALS — BODY MASS INDEX: 42.66 KG/M2 | HEIGHT: 72 IN | WEIGHT: 315 LBS | TEMPERATURE: 98.9 F

## 2019-03-19 DIAGNOSIS — H11.32 SUBCONJUNCTIVAL HEMORRHAGE OF LEFT EYE: Primary | ICD-10-CM

## 2019-03-19 PROCEDURE — 99213 OFFICE O/P EST LOW 20 MIN: CPT | Performed by: INTERNAL MEDICINE

## 2019-03-19 NOTE — PROGRESS NOTES
"Subjective   Catracho Sahu is a 48 y.o. male here for left eye erythema. He was concerned it could be conjunctivitis. He noticed a few days ago it looked a little pink on the left eye, has worsened and he presents today for eval. Says the blood has actually cleared out a bit. No pain in the eye, no vision change (decreased at baseline), no foreign body sensation, no trauma, no itchiness or discharge from the eye. No yanci-orbital swelling.    The following portions of the patient's history were reviewed and updated as appropriate: allergies, current medications, past medical history, past social history and problem list.    Review of Systems:  General: negative  HENT: negative  Eyes: see hpi    Objective   Temp 98.9 °F (37.2 °C) (Temporal)   Ht 182.9 cm (72\")   Wt (!) 204 kg (449 lb)   BMI 60.90 kg/m²     Physical Exam   Constitutional: He appears well-developed and well-nourished.   Eyes: EOM and lids are normal. Pupils are equal, round, and reactive to light. Lids are everted and swept, no foreign bodies found. Right eye exhibits no discharge. Left eye exhibits no discharge and no exudate. No foreign body present in the left eye. Right conjunctiva is not injected. Right conjunctiva has no hemorrhage. Left conjunctiva has a hemorrhage.       Vitals reviewed.      Assessment/Plan   Catracho was seen today for follow-up.    Diagnoses and all orders for this visit:    Subconjunctival hemorrhage of left eye  -reassured pt, no s/s of foreign body, abrasion, conjunctivitis. Will take some time to resolve.         "

## 2019-05-22 ENCOUNTER — HOSPITAL ENCOUNTER (EMERGENCY)
Facility: HOSPITAL | Age: 49
Discharge: HOME | End: 2019-05-22
Attending: EMERGENCY MEDICINE | Admitting: EMERGENCY MEDICINE

## 2019-05-22 ENCOUNTER — APPOINTMENT (OUTPATIENT)
Dept: CT IMAGING | Facility: HOSPITAL | Age: 49
End: 2019-05-22

## 2019-05-22 VITALS
WEIGHT: 315 LBS | HEIGHT: 72 IN | TEMPERATURE: 98.9 F | HEART RATE: 79 BPM | OXYGEN SATURATION: 95 % | SYSTOLIC BLOOD PRESSURE: 146 MMHG | BODY MASS INDEX: 42.66 KG/M2 | DIASTOLIC BLOOD PRESSURE: 83 MMHG | RESPIRATION RATE: 16 BRPM

## 2019-05-22 DIAGNOSIS — N28.1 RENAL CYST, RIGHT: ICD-10-CM

## 2019-05-22 DIAGNOSIS — M79.18 MUSCULOSKELETAL PAIN: ICD-10-CM

## 2019-05-22 DIAGNOSIS — R10.9 LEFT FLANK PAIN: Primary | ICD-10-CM

## 2019-05-22 DIAGNOSIS — R79.89 ELEVATED SERUM CREATININE: ICD-10-CM

## 2019-05-22 LAB
ALBUMIN SERPL-MCNC: 3.7 G/DL (ref 3.5–5.2)
ALBUMIN/GLOB SERPL: 1 G/DL
ALP SERPL-CCNC: 55 U/L (ref 39–117)
ALT SERPL W P-5'-P-CCNC: 14 U/L (ref 1–41)
ANION GAP SERPL CALCULATED.3IONS-SCNC: 10 MMOL/L
AST SERPL-CCNC: 17 U/L (ref 1–40)
BACTERIA UR QL AUTO: ABNORMAL /HPF
BASOPHILS # BLD AUTO: 0.03 10*3/MM3 (ref 0–0.2)
BASOPHILS NFR BLD AUTO: 0.5 % (ref 0–1.5)
BILIRUB SERPL-MCNC: 0.4 MG/DL (ref 0.2–1.2)
BILIRUB UR QL STRIP: NEGATIVE
BUN BLD-MCNC: 17 MG/DL (ref 6–20)
BUN/CREAT SERPL: 12.5 (ref 7–25)
CALCIUM SPEC-SCNC: 8.8 MG/DL (ref 8.6–10.5)
CHLORIDE SERPL-SCNC: 102 MMOL/L (ref 98–107)
CLARITY UR: CLEAR
CO2 SERPL-SCNC: 28 MMOL/L (ref 22–29)
COLOR UR: YELLOW
CREAT BLD-MCNC: 1.36 MG/DL (ref 0.76–1.27)
DEPRECATED RDW RBC AUTO: 42.6 FL (ref 37–54)
EOSINOPHIL # BLD AUTO: 0.18 10*3/MM3 (ref 0–0.4)
EOSINOPHIL NFR BLD AUTO: 2.9 % (ref 0.3–6.2)
ERYTHROCYTE [DISTWIDTH] IN BLOOD BY AUTOMATED COUNT: 13.8 % (ref 12.3–15.4)
GFR SERPL CREATININE-BSD FRML MDRD: 56 ML/MIN/1.73
GLOBULIN UR ELPH-MCNC: 3.6 GM/DL
GLUCOSE BLD-MCNC: 119 MG/DL (ref 65–99)
GLUCOSE UR STRIP-MCNC: NEGATIVE MG/DL
HCT VFR BLD AUTO: 45.8 % (ref 37.5–51)
HGB BLD-MCNC: 15.3 G/DL (ref 13–17.7)
HGB UR QL STRIP.AUTO: ABNORMAL
HOLD SPECIMEN: NORMAL
HOLD SPECIMEN: NORMAL
HYALINE CASTS UR QL AUTO: ABNORMAL /LPF
IMM GRANULOCYTES # BLD AUTO: 0 10*3/MM3 (ref 0–0.05)
IMM GRANULOCYTES NFR BLD AUTO: 0 % (ref 0–0.5)
KETONES UR QL STRIP: NEGATIVE
LEUKOCYTE ESTERASE UR QL STRIP.AUTO: NEGATIVE
LIPASE SERPL-CCNC: 26 U/L (ref 13–60)
LYMPHOCYTES # BLD AUTO: 2.05 10*3/MM3 (ref 0.7–3.1)
LYMPHOCYTES NFR BLD AUTO: 33.3 % (ref 19.6–45.3)
MCH RBC QN AUTO: 28.4 PG (ref 26.6–33)
MCHC RBC AUTO-ENTMCNC: 33.4 G/DL (ref 31.5–35.7)
MCV RBC AUTO: 85 FL (ref 79–97)
MONOCYTES # BLD AUTO: 0.3 10*3/MM3 (ref 0.1–0.9)
MONOCYTES NFR BLD AUTO: 4.9 % (ref 5–12)
NEUTROPHILS # BLD AUTO: 3.59 10*3/MM3 (ref 1.7–7)
NEUTROPHILS NFR BLD AUTO: 58.4 % (ref 42.7–76)
NITRITE UR QL STRIP: NEGATIVE
NRBC BLD AUTO-RTO: 0 /100 WBC (ref 0–0.2)
PH UR STRIP.AUTO: 6 [PH] (ref 5–8)
PLATELET # BLD AUTO: 184 10*3/MM3 (ref 140–450)
PMV BLD AUTO: 9.9 FL (ref 6–12)
POTASSIUM BLD-SCNC: 4.4 MMOL/L (ref 3.5–5.2)
PROT SERPL-MCNC: 7.3 G/DL (ref 6–8.5)
PROT UR QL STRIP: NEGATIVE
RBC # BLD AUTO: 5.39 10*6/MM3 (ref 4.14–5.8)
RBC # UR: ABNORMAL /HPF
REF LAB TEST METHOD: ABNORMAL
SODIUM BLD-SCNC: 140 MMOL/L (ref 136–145)
SP GR UR STRIP: 1.02 (ref 1–1.03)
SQUAMOUS #/AREA URNS HPF: ABNORMAL /HPF
UROBILINOGEN UR QL STRIP: ABNORMAL
WBC NRBC COR # BLD: 6.15 10*3/MM3 (ref 3.4–10.8)
WBC UR QL AUTO: ABNORMAL /HPF
WHOLE BLOOD HOLD SPECIMEN: NORMAL
WHOLE BLOOD HOLD SPECIMEN: NORMAL

## 2019-05-22 PROCEDURE — 83690 ASSAY OF LIPASE: CPT | Performed by: EMERGENCY MEDICINE

## 2019-05-22 PROCEDURE — 99283 EMERGENCY DEPT VISIT LOW MDM: CPT

## 2019-05-22 PROCEDURE — 80053 COMPREHEN METABOLIC PANEL: CPT | Performed by: EMERGENCY MEDICINE

## 2019-05-22 PROCEDURE — 85025 COMPLETE CBC W/AUTO DIFF WBC: CPT | Performed by: EMERGENCY MEDICINE

## 2019-05-22 PROCEDURE — 81001 URINALYSIS AUTO W/SCOPE: CPT | Performed by: EMERGENCY MEDICINE

## 2019-05-22 PROCEDURE — 74176 CT ABD & PELVIS W/O CONTRAST: CPT

## 2019-05-22 RX ORDER — CYCLOBENZAPRINE HCL 10 MG
10 TABLET ORAL 3 TIMES DAILY PRN
Qty: 12 TABLET | Refills: 0 | Status: SHIPPED | OUTPATIENT
Start: 2019-05-22 | End: 2019-09-16

## 2019-05-22 RX ORDER — SODIUM CHLORIDE 0.9 % (FLUSH) 0.9 %
10 SYRINGE (ML) INJECTION AS NEEDED
Status: DISCONTINUED | OUTPATIENT
Start: 2019-05-22 | End: 2019-05-23 | Stop reason: HOSPADM

## 2019-05-31 ENCOUNTER — OFFICE VISIT (OUTPATIENT)
Dept: INTERNAL MEDICINE | Facility: CLINIC | Age: 49
End: 2019-05-31

## 2019-05-31 VITALS
HEIGHT: 72 IN | DIASTOLIC BLOOD PRESSURE: 80 MMHG | WEIGHT: 315 LBS | BODY MASS INDEX: 42.66 KG/M2 | TEMPERATURE: 98.9 F | SYSTOLIC BLOOD PRESSURE: 144 MMHG

## 2019-05-31 DIAGNOSIS — N28.1 COMPLEX RENAL CYST: Primary | ICD-10-CM

## 2019-05-31 DIAGNOSIS — I10 BENIGN ESSENTIAL HTN: ICD-10-CM

## 2019-05-31 DIAGNOSIS — M54.50 ACUTE LEFT-SIDED LOW BACK PAIN WITHOUT SCIATICA: ICD-10-CM

## 2019-05-31 DIAGNOSIS — N17.9 AKI (ACUTE KIDNEY INJURY) (HCC): ICD-10-CM

## 2019-05-31 PROCEDURE — 99214 OFFICE O/P EST MOD 30 MIN: CPT | Performed by: INTERNAL MEDICINE

## 2019-05-31 RX ORDER — HYDROCODONE BITARTRATE AND ACETAMINOPHEN 7.5; 325 MG/1; MG/1
1 TABLET ORAL EVERY 4 HOURS PRN
Qty: 18 TABLET | Refills: 0 | Status: SHIPPED | OUTPATIENT
Start: 2019-05-31 | End: 2019-09-16

## 2019-05-31 NOTE — PROGRESS NOTES
"Subjective   Catracho Sahu is a 48 y.o. male here for follow-up recent ER visit for back pain. He has had this one other time. Says for the last week has had intense low back pain L>R, lumbar area. No sciatica or radiation of pain. Nothing makes it better. Has tried muscle relaxant which does not help. Was given no pain meds. Particularly bothers him at night and he can't sleep. Can't get comfortable. No loss of bowel or bladder or n/t. It was also noted that he had SAM and a renal mass that needs further evaluation. He does not note a hx of this. Was told he had one small cyst. His son has hx of SAM with no known cause (had kidney bx and no etiology found). No fam hx polycystic kidney disease.    The following portions of the patient's history were reviewed and updated as appropriate: allergies, current medications, past family history, past medical history, past social history and problem list.    Review of Systems:  General: negative  : negative  GI: negative  MSK: back pain  Neuro: negative  Skin: negative    Objective   /80 (BP Location: Left arm, Patient Position: Sitting, Cuff Size: Large Adult)   Temp 98.9 °F (37.2 °C) (Temporal)   Ht 182.9 cm (72\")   Wt (!) 203 kg (447 lb)   BMI 60.62 kg/m²     Physical Exam   Constitutional: He is oriented to person, place, and time. He appears well-developed and well-nourished.   Pulmonary/Chest: Effort normal.   Musculoskeletal:   Walks slowly, reduced ROM lumbar spine due to pain   Neurological: He is alert and oriented to person, place, and time.   Skin: Skin is warm and dry.   Psychiatric: He has a normal mood and affect. His behavior is normal. Judgment and thought content normal.   Vitals reviewed.      Assessment/Plan   Catracho was seen today for follow-up.    Diagnoses and all orders for this visit:    Complex renal cyst  -     MRI abdomen w wo contrast: renal protocol  -     Discussed CT findings with pt, need for further imaging     Benign " essential HTN  -consider changing lisinopril especially if Cr still elevated next visit    SAM (acute kidney injury) (CMS/HCC)  -recheck next Friday    Acute left-sided low back pain without sciatica  -     HYDROcodone-acetaminophen (NORCO) 7.5-325 MG per tablet; Take 1 tablet by mouth Every 4 (Four) Hours As Needed for Moderate Pain  or Severe Pain . Advised judicious use of this

## 2019-06-06 ENCOUNTER — TELEPHONE (OUTPATIENT)
Dept: INTERNAL MEDICINE | Facility: CLINIC | Age: 49
End: 2019-06-06

## 2019-06-06 ENCOUNTER — HOSPITAL ENCOUNTER (OUTPATIENT)
Dept: MRI IMAGING | Facility: HOSPITAL | Age: 49
End: 2019-06-06

## 2019-07-02 ENCOUNTER — TELEPHONE (OUTPATIENT)
Dept: INTERNAL MEDICINE | Facility: CLINIC | Age: 49
End: 2019-07-02

## 2019-07-03 DIAGNOSIS — N28.1 COMPLEX RENAL CYST: Primary | ICD-10-CM

## 2019-09-16 ENCOUNTER — OFFICE VISIT (OUTPATIENT)
Dept: INTERNAL MEDICINE | Facility: CLINIC | Age: 49
End: 2019-09-16

## 2019-09-16 VITALS
WEIGHT: 315 LBS | BODY MASS INDEX: 42.66 KG/M2 | SYSTOLIC BLOOD PRESSURE: 130 MMHG | DIASTOLIC BLOOD PRESSURE: 84 MMHG | HEIGHT: 72 IN | TEMPERATURE: 97.8 F

## 2019-09-16 DIAGNOSIS — J01.91 ACUTE RECURRENT SINUSITIS, UNSPECIFIED LOCATION: Primary | ICD-10-CM

## 2019-09-16 PROCEDURE — 99213 OFFICE O/P EST LOW 20 MIN: CPT | Performed by: INTERNAL MEDICINE

## 2019-09-16 PROCEDURE — 96372 THER/PROPH/DIAG INJ SC/IM: CPT | Performed by: INTERNAL MEDICINE

## 2019-09-16 RX ORDER — FLUCONAZOLE 150 MG/1
150 TABLET ORAL ONCE
Qty: 1 TABLET | Refills: 0 | Status: SHIPPED | OUTPATIENT
Start: 2019-09-16 | End: 2019-09-16

## 2019-09-16 RX ORDER — TRIAMCINOLONE ACETONIDE 40 MG/ML
40 INJECTION, SUSPENSION INTRA-ARTICULAR; INTRAMUSCULAR ONCE
Status: COMPLETED | OUTPATIENT
Start: 2019-09-16 | End: 2019-09-16

## 2019-09-16 RX ORDER — AMOXICILLIN AND CLAVULANATE POTASSIUM 875; 125 MG/1; MG/1
1 TABLET, FILM COATED ORAL 2 TIMES DAILY
Qty: 20 TABLET | Refills: 0 | Status: SHIPPED | OUTPATIENT
Start: 2019-09-16 | End: 2019-09-26

## 2019-09-16 RX ADMIN — TRIAMCINOLONE ACETONIDE 40 MG: 40 INJECTION, SUSPENSION INTRA-ARTICULAR; INTRAMUSCULAR at 11:58

## 2019-09-16 NOTE — PROGRESS NOTES
"Subjective   Catracho Sahu is a 49 y.o. male here for URI sx for 3 weeks. It initially started with sinus congestion, cough, fever. He went to [facility], got steroids which did improve sx for a few days but they recurred. He feels he can't \"kick it\" and sx continue. He has sinus congestion, pressure, pain, ear pressure, dry persistent cough. SALEH but that's at baseline. No fever, chills, body aches. He has taken ambrosio seltzer cold and dayquil/nyquil which helps temporarily.      I have reviewed the following portions of the patient's history and confirmed they are accurate: allergies, current medications, past medical history and problem list     I have personally completed the patient's review of systems.    Review of Systems:  General: negative  HEENT: see hpi  Respiratory: cough  GI: negative  Lymph: negative    Objective   /84 (BP Location: Left arm, Patient Position: Sitting, Cuff Size: Large Adult)   Temp 97.8 °F (36.6 °C) (Temporal)   Ht 182.9 cm (72\")   Wt (!) 200 kg (440 lb)   BMI 59.67 kg/m²     Physical Exam   Constitutional: He appears well-developed and well-nourished.   HENT:   Right Ear: Tympanic membrane, external ear and ear canal normal.   Left Ear: Tympanic membrane, external ear and ear canal normal.   Nose: Rhinorrhea present. No mucosal edema. Right sinus exhibits no maxillary sinus tenderness and no frontal sinus tenderness. Left sinus exhibits no maxillary sinus tenderness and no frontal sinus tenderness.   Mouth/Throat: Oropharynx is clear and moist and mucous membranes are normal. No oropharyngeal exudate, posterior oropharyngeal edema or posterior oropharyngeal erythema.   Neck: Neck supple.   Pulmonary/Chest: Effort normal and breath sounds normal. No respiratory distress. He has no wheezes.   Lymphadenopathy:     He has no cervical adenopathy.   Skin: Skin is warm and dry.   Vitals reviewed.      Assessment/Plan   Ctaracho was seen today for cough and allergies.    Diagnoses and " all orders for this visit:    Acute recurrent sinusitis, unspecified location  -     triamcinolone acetonide (KENALOG-40) injection 40 mg  -     amoxicillin-clavulanate (AUGMENTIN) 875-125 MG per tablet; Take 1 tablet by mouth 2 (Two) Times a Day for 10 days.  -rec guaifenesin OTC PRN     Other orders  -     fluconazole (DIFLUCAN) 150 MG tablet; Take 1 tablet by mouth 1 (One) Time for 1 dose.             Myra Roque MA

## 2019-11-14 ENCOUNTER — OFFICE VISIT (OUTPATIENT)
Dept: INTERNAL MEDICINE | Facility: CLINIC | Age: 49
End: 2019-11-14

## 2019-11-14 VITALS
HEIGHT: 72 IN | SYSTOLIC BLOOD PRESSURE: 138 MMHG | WEIGHT: 315 LBS | TEMPERATURE: 97.6 F | BODY MASS INDEX: 42.66 KG/M2 | DIASTOLIC BLOOD PRESSURE: 82 MMHG

## 2019-11-14 DIAGNOSIS — L02.91 ABSCESS: Primary | ICD-10-CM

## 2019-11-14 PROCEDURE — 99213 OFFICE O/P EST LOW 20 MIN: CPT | Performed by: INTERNAL MEDICINE

## 2019-11-14 RX ORDER — SULFAMETHOXAZOLE AND TRIMETHOPRIM 800; 160 MG/1; MG/1
1 TABLET ORAL 2 TIMES DAILY
Qty: 10 TABLET | Refills: 0 | Status: SHIPPED | OUTPATIENT
Start: 2019-11-14 | End: 2019-11-19

## 2019-11-14 NOTE — PROGRESS NOTES
"Subjective   Catracho Sahu is a 49 y.o. male here for a knot on his neck he notes this morning while shaving.  When he passed the razor over his neck he felt a sharp pain.  He noticed the area was a little bit red.  It just started today.  He denies any other skin changes or bumps that he has noticed.  He denies feeling ill.  He is  and thinking about getting back into the dating world.    I have reviewed the following portions of the patient's history and confirmed they are accurate: current medications, past medical history, past social history and problem list     I have personally completed the patient's review of systems.    Review of Systems:  General: negative  HEENT: See HPI  Skin: Pain, redness    Objective   /82 (BP Location: Left arm, Patient Position: Sitting, Cuff Size: Large Adult)   Temp 97.6 °F (36.4 °C) (Temporal)   Ht 182.9 cm (72\")   Wt (!) 201 kg (444 lb)   BMI 60.22 kg/m²     Physical Exam   Constitutional: He is oriented to person, place, and time. He appears well-developed and well-nourished.   Pulmonary/Chest: Effort normal.   Neurological: He is alert and oriented to person, place, and time.   Skin: Skin is warm and dry.   0.5 cm midline lower neck superficial abscess.  No fluctuance.  Tender to palpation.  No other lesions noted over the neck   Vitals reviewed.      Assessment/Plan   Catracho was seen today for follow-up.    Diagnoses and all orders for this visit:    Abscess  -     sulfamethoxazole-trimethoprim (BACTRIM DS) 800-160 MG per tablet; Take 1 tablet by mouth 2 (Two) Times a Day for 5 days.  -Told patient to not pick this up unless the lesion gets much larger and much more painful.  Advised him that it may spontaneously drain on its own and he could even self drain at home should it come to a head.              Myra Roque MA    Please note that portions of this note were completed with a voice recognition program. Efforts were made to edit the " dictations, but occasionally words are mistranscribed.

## 2019-11-26 ENCOUNTER — OFFICE VISIT (OUTPATIENT)
Dept: INTERNAL MEDICINE | Facility: CLINIC | Age: 49
End: 2019-11-26

## 2019-11-26 VITALS
OXYGEN SATURATION: 97 % | RESPIRATION RATE: 16 BRPM | SYSTOLIC BLOOD PRESSURE: 140 MMHG | BODY MASS INDEX: 42.66 KG/M2 | WEIGHT: 315 LBS | HEIGHT: 72 IN | TEMPERATURE: 97.9 F | HEART RATE: 90 BPM | DIASTOLIC BLOOD PRESSURE: 92 MMHG

## 2019-11-26 DIAGNOSIS — H10.9 CONJUNCTIVITIS OF BOTH EYES, UNSPECIFIED CONJUNCTIVITIS TYPE: Primary | ICD-10-CM

## 2019-11-26 PROCEDURE — 99213 OFFICE O/P EST LOW 20 MIN: CPT | Performed by: NURSE PRACTITIONER

## 2019-11-26 RX ORDER — ERYTHROMYCIN 5 MG/G
OINTMENT OPHTHALMIC 3 TIMES DAILY
Qty: 3.5 G | Refills: 0 | Status: SHIPPED | OUTPATIENT
Start: 2019-11-26 | End: 2020-04-28

## 2019-11-26 RX ORDER — LORATADINE 10 MG/1
10 TABLET, ORALLY DISINTEGRATING ORAL DAILY
Qty: 30 TABLET | Refills: 2 | Status: SHIPPED | OUTPATIENT
Start: 2019-11-26 | End: 2020-11-04

## 2019-11-26 NOTE — PROGRESS NOTES
Subjective   Catracho Sahu is a 49 y.o. male here today for eye redness.    Chief Complaint   Patient presents with   • Conjunctivitis   Catracho is here today with a 2-day history of eye redness and itching.  He does report he woke up and his eyes were crusted-unsure what color this is.  Not having any drainage during the day.  He does have some postnasal drip.  Uncertain if he has seasonal allergies.  No one around him has had pinkeye.  No fever or chills.  No eye pain, blurred vision.    Review of Systems   Constitutional: Negative for activity change, appetite change, chills, fatigue and fever.   HENT: Negative for congestion, ear discharge, ear pain, postnasal drip, rhinorrhea, sinus pressure, sore throat, swollen glands and voice change.         Post nasal drip   Eyes: Positive for redness and itching. Negative for photophobia.   Respiratory: Negative for cough, shortness of breath and wheezing.    Cardiovascular: Negative for chest pain and leg swelling.   Musculoskeletal: Negative for arthralgias and myalgias.   Skin: Negative for color change and rash.   Allergic/Immunologic: Negative for environmental allergies and immunocompromised state.   Neurological: Negative for dizziness, weakness and headache.   Hematological: Negative for adenopathy. Does not bruise/bleed easily.       Past Medical History:   Diagnosis Date   • Fractures    • Hypertension    • Sleep apnea      Family History   Problem Relation Age of Onset   • Migraines Mother    • Hypertension Father    • Stroke Father    • Hypertension Paternal Aunt    • Cancer Maternal Grandmother    • Parkinsonism Maternal Grandfather    • Heart attack Paternal Grandmother    • Hypertension Paternal Grandmother    • Dementia Paternal Grandfather    • Stroke Paternal Grandfather      Past Surgical History:   Procedure Laterality Date   • COLONOSCOPY  2008   • TONSILLECTOMY  2002   • UVULOPALATOPHARYNGOPLASTY       Social History     Socioeconomic History  "  • Marital status:      Spouse name: Not on file   • Number of children: Not on file   • Years of education: Not on file   • Highest education level: Not on file   Tobacco Use   • Smoking status: Never Smoker   • Smokeless tobacco: Never Used   Substance and Sexual Activity   • Alcohol use: No   • Drug use: No   • Sexual activity: Defer         Current Outpatient Medications:   •  lisinopril (PRINIVIL,ZESTRIL) 40 MG tablet, Take 1 tablet by mouth Daily., Disp: 30 tablet, Rfl: 5  •  erythromycin (ROMYCIN) 5 MG/GM ophthalmic ointment, Administer  to both eyes 3 (Three) Times a Day. For 7 days, Disp: 3.5 g, Rfl: 0  •  loratadine (CLARITIN REDITABS) 10 MG disintegrating tablet, Take 1 tablet by mouth Daily., Disp: 30 tablet, Rfl: 2    Objective   Vitals:    11/26/19 1141   BP: 140/92   Pulse: 90   Resp: 16   Temp: 97.9 °F (36.6 °C)   TempSrc: Temporal   SpO2: 97%   Weight: (!) 201 kg (444 lb)   Height: 182.9 cm (72\")     Body mass index is 60.22 kg/m².  Physical Exam   Constitutional: He does not have a sickly appearance. No distress.   HENT:   Head: Normocephalic and atraumatic.   Right Ear: Tympanic membrane and external ear normal.   Left Ear: Tympanic membrane and external ear normal.   Nose: No mucosal edema or rhinorrhea. Right sinus exhibits no maxillary sinus tenderness and no frontal sinus tenderness. Left sinus exhibits no maxillary sinus tenderness and no frontal sinus tenderness.   Mouth/Throat: Oropharynx is clear and moist and mucous membranes are normal. No oropharyngeal exudate, posterior oropharyngeal edema, posterior oropharyngeal erythema or tonsillar abscesses. No tonsillar exudate.   Eyes: Pupils are equal, round, and reactive to light. Right eye exhibits no discharge, no exudate and no hordeolum. No foreign body present in the right eye. Left eye exhibits no discharge, no exudate and no hordeolum. No foreign body present in the left eye. Right conjunctiva is injected. Right conjunctiva has " no hemorrhage. Left conjunctiva is injected. Left conjunctiva has no hemorrhage. No scleral icterus. Right eye exhibits normal extraocular motion and no nystagmus. Left eye exhibits normal extraocular motion and no nystagmus.   Neck: No neck rigidity. No thyromegaly present.   Cardiovascular: Normal rate and regular rhythm.   Pulmonary/Chest: Effort normal and breath sounds normal.   Lymphadenopathy:        Head (right side): No submental, no submandibular, no tonsillar, no preauricular, no posterior auricular and no occipital adenopathy present.        Head (left side): No submental, no submandibular, no tonsillar, no preauricular, no posterior auricular and no occipital adenopathy present.     He has no cervical adenopathy.        Right cervical: No superficial cervical, no deep cervical and no posterior cervical adenopathy present.       Left cervical: No superficial cervical, no deep cervical and no posterior cervical adenopathy present.   Neurological: He is alert.   Skin: Skin is warm, dry and intact. Capillary refill takes 2 to 3 seconds. He is not diaphoretic. No pallor.   Psychiatric: He has a normal mood and affect. His speech is normal and behavior is normal. Cognition and memory are normal.   Nursing note and vitals reviewed.      Assessment/Plan   Problem List Items Addressed This Visit     None      Visit Diagnoses     Conjunctivitis of both eyes, unspecified conjunctivitis type    -  Primary    Relevant Medications    erythromycin (ROMYCIN) 5 MG/GM ophthalmic ointment    loratadine (CLARITIN REDITABS) 10 MG disintegrating tablet        Catracho was seen today for conjunctivitis.    Diagnoses and all orders for this visit:    Conjunctivitis of both eyes, unspecified conjunctivitis type  -     erythromycin (ROMYCIN) 5 MG/GM ophthalmic ointment; Administer  to both eyes 3 (Three) Times a Day. For 7 days  -     loratadine (CLARITIN REDITABS) 10 MG disintegrating tablet; Take 1 tablet by mouth  Daily.    Discussed appropriate application of antibiotic ointment.  Recommend using Q-tip to apply to avoid contamination of ointment container.         The patient was counseled regarding diagnostic results, impressions, prognosis, instructions for management, risk factor reductions, education, and importance of treatment compliance.  The patient verbalized understanding of and agreement with the plan of care.    Advised patient to call with any further questions and any new or worsening symptoms.     Return if symptoms worsen or fail to improve.      Shonna Ruiz, APRN    Please note that portions of this note were completed with a voice recognition program. Efforts were made to edit the dictations, but occasionally words are mistranscribed.

## 2020-04-28 ENCOUNTER — TELEPHONE (OUTPATIENT)
Dept: INTERNAL MEDICINE | Facility: CLINIC | Age: 50
End: 2020-04-28

## 2020-04-28 ENCOUNTER — NURSE TRIAGE (OUTPATIENT)
Dept: CALL CENTER | Facility: HOSPITAL | Age: 50
End: 2020-04-28

## 2020-04-28 ENCOUNTER — OFFICE VISIT (OUTPATIENT)
Dept: INTERNAL MEDICINE | Facility: CLINIC | Age: 50
End: 2020-04-28

## 2020-04-28 DIAGNOSIS — R06.02 SHORTNESS OF BREATH: ICD-10-CM

## 2020-04-28 DIAGNOSIS — J30.2 SEASONAL ALLERGIC RHINITIS, UNSPECIFIED TRIGGER: ICD-10-CM

## 2020-04-28 DIAGNOSIS — R05.9 COUGH: Primary | ICD-10-CM

## 2020-04-28 DIAGNOSIS — N28.1 COMPLEX RENAL CYST: ICD-10-CM

## 2020-04-28 PROCEDURE — 99441 PR PHYS/QHP TELEPHONE EVALUATION 5-10 MIN: CPT | Performed by: INTERNAL MEDICINE

## 2020-04-28 NOTE — TELEPHONE ENCOUNTER
He has had a cough for 2 weeks. Wakes him up at night. Nonproductive. His chest is painful to touch. Denies s/s of heart attack. Has some SOB with sitting. He has seasonal allergies. Call transfer to office.     Reason for Disposition  • [1] Continuous (nonstop) coughing interferes with work or school AND [2] no improvement using cough treatment per protocol    Additional Information  • Negative: SEVERE difficulty breathing (e.g., struggling for each breath, speaks in single words)  • Negative: Difficult to awaken or acting confused (e.g., disoriented, slurred speech)  • Negative: Bluish (or gray) lips or face now  • Negative: Shock suspected (e.g., cold/pale/clammy skin, too weak to stand, low BP, rapid pulse)  • Negative: Sounds like a life-threatening emergency to the triager  • Negative: [1] COVID-19 suspected (e.g., cough, fever, shortness of breath) AND [2] public health department recommends testing  • Negative: [1] COVID-19 exposure AND [2] no symptoms  • Negative: COVID-19 and Breastfeeding, questions about  • Negative: SEVERE or constant chest pain (Exception: mild central chest pain, present only when coughing)  • Negative: MODERATE difficulty breathing (e.g., speaks in phrases, SOB even at rest, pulse 100-120)  • Negative: Patient sounds very sick or weak to the triager  • Negative: MILD difficulty breathing (e.g., minimal/no SOB at rest, SOB with walking, pulse <100)  • Negative: Chest pain  • Negative: Fever > 103 F (39.4 C)  • Negative: [1] Fever > 101 F (38.3 C) AND [2] age > 60  • Negative: [1] Fever > 100.0 F (37.8 C) AND [2] bedridden (e.g., nursing home patient, CVA, chronic illness, recovering from surgery)  • Negative: HIGH RISK patient (e.g., age > 64 years, diabetes, heart or lung disease, weak immune system)  • Negative: Fever present > 3 days (72 hours)  • Negative: [1] Fever returns after gone for over 24 hours AND [2] symptoms worse or not improved  • Negative: Cough present > 3 weeks  •  "Negative: 1] COVID-19 infection diagnosed or suspected AND [2] mild symptoms (fever, cough) AND [2] no trouble breathing or other complications  • Negative: COVID-19, questions about  • Negative: COVID-19 Home Isolation, questions about  • Negative: COVID-19 Prevention and Healthy Living, questions about  • Negative: COVID-19 Testing, questions about    Answer Assessment - Initial Assessment Questions  1. COVID-19 DIAGNOSIS: \"Who made your Coronavirus (COVID-19) diagnosis?\" \"Was it confirmed by a positive lab test?\" If not diagnosed by a HCP, ask \"Are there lots of cases (community spread) where you live?\" (See public health department website, if unsure)    * MAJOR community spread: high number of cases; numbers of cases are increasing; many people hospitalized.    * MINOR community spread: low number of cases; not increasing; few or no people hospitalized      MAJOR  2. ONSET: \"When did the COVID-19 symptoms start?\"     2 WKS AGO  3. WORST SYMPTOM: \"What is your worst symptom?\" (e.g., cough, fever, shortness of breath, muscle aches)      COUGH  4. COUGH: \"How bad is the cough?\"        BAD, CHEST IS NOW PAINFUL  5. FEVER: \"Do you have a fever?\" If so, ask: \"What is your temperature, how was it measured, and when did it start?\"      NO  6. RESPIRATORY STATUS: \"Describe your breathing?\" (e.g., shortness of breath, wheezing, unable to speak)       SOB  7. BETTER-SAME-WORSE: \"Are you getting better, staying the same or getting worse compared to yesterday?\"  If getting worse, ask, \"In what way?\"      WORSE  8. HIGH RISK DISEASE: \"Do you have any chronic medical problems?\" (e.g., asthma, heart or lung disease, weak immune system, etc.)      NO  9. PREGNANCY: \"Is there any chance you are pregnant?\" \"When was your last menstrual period?\"      NO  10. OTHER SYMPTOMS: \"Do you have any other symptoms?\"  (e.g., runny nose, headache, sore throat, loss of smell)        EAR ACHE    Protocols used: CORONAVIRUS (COVID-19) DIAGNOSED " OR SUSPECTED-ADULT-AH

## 2020-04-28 NOTE — PROGRESS NOTES
Subjective   Catracho Sahu is a 49 y.o. male seen via telephone visit for cough and SOA. He initially thought it was allergies as it was sinus drainage, dry cough. However, yesterday he developed some SALEH which he had not had. He is on antihistamine. He visited a friend within the last week whose mother tested positive (and the man had seen his mother recently). Pt is potentially exposed to covid. He denies severe SOA. His cough is still dry. No fever or constitutional sx. He also has chronic back pain, does still bother him. Went to ER over it 5/2019 and had CT abd which showed complex renal cyst. Never had MRI to [facility] as he was moving and it never got scheduled. He has hx pre-DM and during the ER visit he had SAM that wasn't rechecked.    I have reviewed the following portions of the patient's history and confirmed they are accurate: allergies, current medications, past medical history, past social history and problem list     I have personally completed the patient's review of systems.    Review of Systems:  General: negative  HEENT: sinus congestion and drainage  CV: negative  Respiratory: SALEH, cough  MSK: back pain  : negative    Objective   There were no vitals taken for this visit.    Physical Exam    Assessment/Plan   Catracho was seen today for follow-up.    Diagnoses and all orders for this visit:    Cough  -potential covid exposure. rec testing at [facility] as he does have cough, SOA, +contact. He may have one of the high risk diagnoses including DM or kidney disease. Has not had labs done in a long time and had SAM and pre-DM. rec for 14d quarantine.    Shortness of breath  -see cough    Seasonal allergic rhinitis, unspecified trigger  -continue allergy meds    Complex renal cyst  -MRI ordered in the past, pt never got done. Discussed this with pt that it needs to be done as he continues to have back pain and the cyst was large and complex at that time. He expresses understanding.    You have chosen  to receive care through a telephone visit. Do you consent to use a telephone visit for your medical care today? Yes  This visit has been rescheduled as a phone visit to comply with patient safety concerns in accordance with CDC recommendations. Total time of discussion was 8 minutes.             Myra Roque MA    Please note that portions of this note were completed with a voice recognition program. Efforts were made to edit the dictations, but occasionally words are mistranscribed.

## 2020-04-28 NOTE — TELEPHONE ENCOUNTER
SPOKE WITH TRIAGE NURSE PRIOR TO SPEAKING WITH MR. SOARES HE HAS PAIN IN HIS CHEST FROM A DRY COUGH THAT HE HAS HAD FOR A FEW.NURSE WAS SCREENING HIM FOR COVID. NO TEMP AT THE TIME OF TRIAGE. WANTED TO KNOW IF HE SHOULD INCREASE ALLERGY MEDICATION.

## 2020-05-11 ENCOUNTER — TELEPHONE (OUTPATIENT)
Dept: INTERNAL MEDICINE | Facility: CLINIC | Age: 50
End: 2020-05-11

## 2020-05-11 DIAGNOSIS — N28.1 COMPLEX RENAL CYST: Primary | ICD-10-CM

## 2020-05-11 NOTE — TELEPHONE ENCOUNTER
Patient states that he is supposed to be getting an MRI on his kidney's and was wondering the status.  He can be reached at [phone]

## 2020-05-11 NOTE — TELEPHONE ENCOUNTER
It is ordered. I think we had talked about how it needed to be done but he was iffy on it given pandemic so put it off a bit. It has been ordered now though.

## 2020-06-10 ENCOUNTER — TELEPHONE (OUTPATIENT)
Dept: INTERNAL MEDICINE | Facility: CLINIC | Age: 50
End: 2020-06-10

## 2020-06-10 ENCOUNTER — HOSPITAL ENCOUNTER (OUTPATIENT)
Dept: MRI IMAGING | Facility: HOSPITAL | Age: 50
Discharge: HOME | End: 2020-06-10

## 2020-06-10 DIAGNOSIS — N28.1 COMPLEX RENAL CYST: ICD-10-CM

## 2020-06-10 NOTE — TELEPHONE ENCOUNTER
PT CALLED IN STATED HE WENT TO HAVE HIS MRI COMPLETED BUT COULD NOT FIT INTO THE MACHINE WOULD LIKE TO KNOW WHAT THE PHYSICIAN SUGGEST PLEASE ADVISE      PT CB NUMBER: [phone]

## 2020-06-17 ENCOUNTER — TELEPHONE (OUTPATIENT)
Dept: INTERNAL MEDICINE | Facility: CLINIC | Age: 50
End: 2020-06-17

## 2020-06-17 NOTE — TELEPHONE ENCOUNTER
Can you call him and do the covid screening on him? He has a cough and Dr. CAST can't approve him to come in without the screening.

## 2020-06-17 NOTE — TELEPHONE ENCOUNTER
They should have done covid screening on this, so I really can't approve the appt as I don't know what it's truly for.

## 2020-06-19 ENCOUNTER — TELEPHONE (OUTPATIENT)
Dept: INTERNAL MEDICINE | Facility: CLINIC | Age: 50
End: 2020-06-19

## 2020-06-19 NOTE — TELEPHONE ENCOUNTER
PT CALLED STATING THAT HE WENT TO GET A MRI AND HE WASN'T ABLE TO FIT IN THE MACHINE.  PT STATED THAT THEY SUGGESTED THAT HE GET A CT SCAN ON HIS KIDNEYS.  PT ASKED WHAT IS HIS NEXT STEPS.    PT CONTACT [phone]

## 2020-06-22 DIAGNOSIS — N28.1 COMPLEX RENAL CYST: Primary | ICD-10-CM

## 2020-11-03 ENCOUNTER — TELEPHONE (OUTPATIENT)
Dept: INTERNAL MEDICINE | Facility: CLINIC | Age: 50
End: 2020-11-03

## 2020-11-03 NOTE — TELEPHONE ENCOUNTER
I think he should come in to see me. Ok for that as the cough is chronic and he has had all the negative testing. Stop the acid med.

## 2020-11-03 NOTE — TELEPHONE ENCOUNTER
Patient has had a chronic cough for over three months and patient has been tested for flu, COVID, bronchotitus and all tests have come back negative.  Patient has chronic cough for all day long.  Patient states over the last few weeks it has gotten worst.  Patient would like a call back on what can be done to assist.  Please advise        Catracho Sahu call back [phone]

## 2020-11-03 NOTE — TELEPHONE ENCOUNTER
Patient sts he was seen at a [facility] in [place] last week. They tested him for COVID and flu and he was negative. They done x-rays of his chest and didn't see anything. He is essentially feeling better other than a persistent dry cough that won't go away. He says they gave him a med for acid reflux to see if that would help with the cough. He says he feels it actually made it worse. Asking for suggestions?

## 2020-11-04 ENCOUNTER — OFFICE VISIT (OUTPATIENT)
Dept: INTERNAL MEDICINE | Facility: CLINIC | Age: 50
End: 2020-11-04

## 2020-11-04 VITALS
OXYGEN SATURATION: 96 % | HEART RATE: 83 BPM | TEMPERATURE: 96.9 F | SYSTOLIC BLOOD PRESSURE: 144 MMHG | BODY MASS INDEX: 42.66 KG/M2 | WEIGHT: 315 LBS | HEIGHT: 72 IN | DIASTOLIC BLOOD PRESSURE: 88 MMHG

## 2020-11-04 DIAGNOSIS — J30.9 ALLERGIC RHINITIS, UNSPECIFIED SEASONALITY, UNSPECIFIED TRIGGER: ICD-10-CM

## 2020-11-04 DIAGNOSIS — N28.1 COMPLEX RENAL CYST: ICD-10-CM

## 2020-11-04 DIAGNOSIS — R05.3 CHRONIC COUGH: Primary | ICD-10-CM

## 2020-11-04 PROCEDURE — 99214 OFFICE O/P EST MOD 30 MIN: CPT | Performed by: INTERNAL MEDICINE

## 2020-11-04 RX ORDER — LORATADINE 10 MG/1
10 TABLET, ORALLY DISINTEGRATING ORAL DAILY
Qty: 30 TABLET | Refills: 5 | Status: ON HOLD | OUTPATIENT
Start: 2020-11-04 | End: 2021-02-11

## 2020-11-04 RX ORDER — GUAIFENESIN AND CODEINE PHOSPHATE 100; 10 MG/5ML; MG/5ML
5 SOLUTION ORAL 3 TIMES DAILY PRN
Qty: 236 ML | Refills: 2 | Status: SHIPPED | OUTPATIENT
Start: 2020-11-04 | End: 2021-03-10

## 2020-11-04 NOTE — PROGRESS NOTES
"Subjective   Catracho Sahu is a 50 y.o. male here for worsening cough. Has had cough over the last 2 mos, worsened recently to where it is almost constant. He coughs particularly in the am, to the point of gagging. Has only had emesis once. He has been taking OTC cough med which has not helped. No wheezing, no SOA or SALEH outside of his baseline. He denies any smoking hx or hx of asthma. He has not had any constitutional sx. Coughs infrequently at night so generally sleeping well. He does have some allergies and is on loratidine. Started PPI last week. Has been tested for covid, flu, had CXR which were unremarkable. Was told it was viral. Working from home. No other sx. He does also have hx of complex renal cyst, saw uro at  and had repeat CT. He doesn't think anything came of that, no intervention needed per uro.    I have reviewed the following portions of the patient's history and confirmed they are accurate: allergies, current medications, past medical history, past social history, past surgical history and problem list     I have personally completed the patient's review of systems.    Review of Systems:  General: negative  HEENT: negative  CV: negative  Respiratory: see hpi  GI: acid reflux    Objective   /88 (BP Location: Left arm, Patient Position: Sitting, Cuff Size: Large Adult)   Pulse 83   Temp 96.9 °F (36.1 °C) (Temporal)   Ht 182.9 cm (72\")   Wt (!) 202 kg (445 lb)   SpO2 96%   BMI 60.35 kg/m²     Physical Exam  Vitals signs reviewed.   Constitutional:       Appearance: Normal appearance. He is well-developed. He is obese.   HENT:      Head: Normocephalic and atraumatic.   Eyes:      Conjunctiva/sclera: Conjunctivae normal.   Cardiovascular:      Rate and Rhythm: Normal rate and regular rhythm.      Heart sounds: Normal heart sounds.   Pulmonary:      Effort: Pulmonary effort is normal. No respiratory distress.      Breath sounds: Normal breath sounds. No stridor. No wheezing, " rhonchi or rales.      Comments: Coughing present during exam, to the point of gagging  Skin:     General: Skin is warm and dry.   Neurological:      Mental Status: He is alert and oriented to person, place, and time.   Psychiatric:         Behavior: Behavior normal.         Thought Content: Thought content normal.         Assessment/Plan   Diagnoses and all orders for this visit:    1. Chronic cough (Primary)  -     Ambulatory Referral to Pulmonology  -     guaiFENesin-codeine (GUAIFENESIN AC) 100-10 MG/5ML liquid; Take 5 mL by mouth 3 (Three) Times a Day As Needed for Cough.  Dispense: 236 mL; Refill: 2  -will get CXR done at OSH and likely order CT chest from there. With ongoing sx and worsening recently (severe persistent cough) needs further workup  -GERD, allergies, asthma, other pulmonary etiology on ddx  -on PPI and allergy med, urged him to continue this until true etiology is found    2. Allergic rhinitis, unspecified seasonality, unspecified trigger  -     loratadine (CLARITIN REDITABS) 10 MG disintegrating tablet; Place 1 tablet on the tongue Daily.  Dispense: 30 tablet; Refill: 5    3. Complex renal cyst  -has seen uro, had f/u CT renal protocol, will get records to make sure no further f/u is needed as it was a very large cyst           Myra Roque MA    Please note that portions of this note were completed with a voice recognition program. Efforts were made to edit the dictations, but occasionally words are mistranscribed.

## 2020-11-12 DIAGNOSIS — R05.9 COUGH: Primary | ICD-10-CM

## 2020-12-23 DIAGNOSIS — I10 BENIGN ESSENTIAL HTN: ICD-10-CM

## 2020-12-23 RX ORDER — LISINOPRIL 40 MG/1
40 TABLET ORAL DAILY
Qty: 90 TABLET | Refills: 3 | Status: SHIPPED | OUTPATIENT
Start: 2020-12-23 | End: 2021-12-27

## 2020-12-23 NOTE — TELEPHONE ENCOUNTER
Caller: Catracho Sahu    Relationship: Self    Best call back number: [phone]    Medication needed:   Requested Prescriptions     Pending Prescriptions Disp Refills   • lisinopril (PRINIVIL,ZESTRIL) 40 MG tablet 30 tablet 5     Sig: Take 1 tablet by mouth Daily.       When do you need the refill by: ASAP    What details did the patient provide when requesting the medication: PATIENT CALLED REQUESTING A REFILL ON ABOVE MEDICATION    Does the patient have less than a 3 day supply:  [x] Yes  [] No    What is the patient's preferred pharmacy: [facility] #[id number] [address] AT [facility] & [facility] [phone] [facility] [phone]

## 2021-01-25 ENCOUNTER — TELEPHONE (OUTPATIENT)
Dept: INTERNAL MEDICINE | Facility: CLINIC | Age: 51
End: 2021-01-25

## 2021-01-25 RX ORDER — OMEPRAZOLE 20 MG/1
1 CAPSULE, DELAYED RELEASE ORAL DAILY
COMMUNITY
Start: 2020-12-10 | End: 2021-01-25 | Stop reason: SDUPTHER

## 2021-01-25 RX ORDER — OMEPRAZOLE 20 MG/1
20 CAPSULE, DELAYED RELEASE ORAL DAILY
Qty: 30 CAPSULE | Refills: 5 | Status: SHIPPED | OUTPATIENT
Start: 2021-01-25 | End: 2022-02-24

## 2021-01-25 NOTE — TELEPHONE ENCOUNTER
Has he tried anything OTC, even ibu 800mg TID? He's not a frequent pain med type of thomas. Any swelling or redness?

## 2021-01-25 NOTE — TELEPHONE ENCOUNTER
Caller: Catracho Sahu    Relationship: Self    Best call back number: [phone]     What medication are you requesting: omeprazole 20MG    If a prescription is needed, what is your preferred pharmacy and phone number: [facility] #[id number] [address] AT [facility] & [facility] [phone] [facility] [phone]      Additional notes: PATIENT STATES THAT HE GOT THIS WHEN HE WAS IN THE E.R. AND NEEDS A REFILL ON THIS BECAUSE IT IS THE ONLY THING THAT'S WORKING.

## 2021-01-25 NOTE — TELEPHONE ENCOUNTER
PATIENT CALLED TO REQUEST PAIN MEDICATION TO BE PRESCRIBED TO HELP WITH RIGHT KNEE PAIN.  PATIENT STATED HAS BEEN IN PAIN LAST WEEK, DIFFICULTY WALKING.    PATIENT STATED HIS SON TESTED POSITIVE FOR COVID YESTERDAY, 01/24.  HOUSEHOLD CURRENTLY IN QUARANTINE.    PHARMACY:      [facility] #[id number] [place] [address] [address] [facility] AT [facility] & [facility] - [phone] [facility] [phone]   [phone]      PLEASE ADVISE:  [phone]

## 2021-02-09 ENCOUNTER — TELEPHONE (OUTPATIENT)
Dept: INTERNAL MEDICINE | Facility: CLINIC | Age: 51
End: 2021-02-09

## 2021-02-09 NOTE — TELEPHONE ENCOUNTER
When did symptoms start? He would be eligible for the resdemivir if within 10 days. It cuts the days of symptoms. He has to go to hospital for infusion.

## 2021-02-09 NOTE — TELEPHONE ENCOUNTER
Called patient. Provided with my [facility] email. He is going to forward me his results to attach to the fax.

## 2021-02-09 NOTE — TELEPHONE ENCOUNTER
Caller: Catracho Sahu    Relationship to patient: Self    Best call back number: [phone]     Concerns or Questions if Applicable: PATIENT CALLED AND STATED THAT HE TESTED POSITIVE FOR COVID ON Friday WITH SYMPTOMS STARTING ON Thursday.      PATIENT STATED THAT HE IS BREATHING OKAY BUT  WHEN HE TAKES A DEEP BREATH HE BEGINS TO COUGH, FEVER IS UP .5.     PATIENT WOULD LIKE TO KNOW IF THERE IS ANYTHING HE COULD TAKE  TO HELP IMPROVE HIS SYMPTOMS.  STATED THAT HE CONTINUES TO FEEL WORSE.  IS TAKING DAYQUIL AND NYQUIL COLD RELIEF.     PREFERRED PHARMACY[facility]   [id number] [place] [address] [address] [facility] AT [facility] & [facility] [phone] [facility] [phone]

## 2021-02-10 NOTE — TELEPHONE ENCOUNTER
Spoke with patient he says he was tested at [facility] and they won't email or fax his results to him as he has already tried. He is going to go  his results and scan them into an email and send to me.

## 2021-02-10 NOTE — TELEPHONE ENCOUNTER
[facility] at the hospital is looking for the actual PCR test that patient recently took.      Got the Health Department documents.      [phone] [address]  Fax: [phone]

## 2021-02-11 ENCOUNTER — APPOINTMENT (OUTPATIENT)
Dept: CT IMAGING | Facility: HOSPITAL | Age: 51
End: 2021-02-11

## 2021-02-11 ENCOUNTER — HOSPITAL ENCOUNTER (INPATIENT)
Facility: HOSPITAL | Age: 51
LOS: 4 days | Discharge: HOME | End: 2021-02-15
Attending: EMERGENCY MEDICINE | Admitting: FAMILY MEDICINE

## 2021-02-11 ENCOUNTER — APPOINTMENT (OUTPATIENT)
Dept: GENERAL RADIOLOGY | Facility: HOSPITAL | Age: 51
End: 2021-02-11

## 2021-02-11 ENCOUNTER — TELEPHONE (OUTPATIENT)
Dept: INTERNAL MEDICINE | Facility: CLINIC | Age: 51
End: 2021-02-11

## 2021-02-11 DIAGNOSIS — U07.1 PNEUMONIA DUE TO COVID-19 VIRUS: Primary | ICD-10-CM

## 2021-02-11 DIAGNOSIS — J12.82 PNEUMONIA DUE TO COVID-19 VIRUS: Primary | ICD-10-CM

## 2021-02-11 DIAGNOSIS — Z86.79 HISTORY OF HYPERTENSION: ICD-10-CM

## 2021-02-11 DIAGNOSIS — J30.9 ALLERGIC RHINITIS, UNSPECIFIED SEASONALITY, UNSPECIFIED TRIGGER: ICD-10-CM

## 2021-02-11 PROBLEM — J96.01 ACUTE RESPIRATORY FAILURE WITH HYPOXIA (HCC): Status: ACTIVE | Noted: 2021-02-11

## 2021-02-11 LAB
ALBUMIN SERPL-MCNC: 3.8 G/DL (ref 3.5–5.2)
ALBUMIN/GLOB SERPL: 1.1 G/DL
ALP SERPL-CCNC: 66 U/L (ref 39–117)
ALT SERPL W P-5'-P-CCNC: 22 U/L (ref 1–41)
ANION GAP SERPL CALCULATED.3IONS-SCNC: 9 MMOL/L (ref 5–15)
AST SERPL-CCNC: 34 U/L (ref 1–40)
BACTERIA UR QL AUTO: ABNORMAL /HPF
BASOPHILS # BLD AUTO: 0.01 10*3/MM3 (ref 0–0.2)
BASOPHILS NFR BLD AUTO: 0.3 % (ref 0–1.5)
BILIRUB SERPL-MCNC: 0.7 MG/DL (ref 0–1.2)
BILIRUB UR QL STRIP: ABNORMAL
BUN SERPL-MCNC: 13 MG/DL (ref 6–20)
BUN/CREAT SERPL: 10.8 (ref 7–25)
CALCIUM SPEC-SCNC: 8.7 MG/DL (ref 8.6–10.5)
CHLORIDE SERPL-SCNC: 103 MMOL/L (ref 98–107)
CLARITY UR: CLEAR
CO2 SERPL-SCNC: 26 MMOL/L (ref 22–29)
COLOR UR: ABNORMAL
CREAT SERPL-MCNC: 1.2 MG/DL (ref 0.76–1.27)
CRP SERPL-MCNC: 2.84 MG/DL (ref 0–0.5)
D DIMER PPP FEU-MCNC: 0.47 MCGFEU/ML (ref 0–0.56)
D DIMER PPP FEU-MCNC: 0.48 MCGFEU/ML (ref 0–0.56)
D-LACTATE SERPL-SCNC: 0.8 MMOL/L (ref 0.5–2)
DEPRECATED RDW RBC AUTO: 42.4 FL (ref 37–54)
EOSINOPHIL # BLD AUTO: 0.05 10*3/MM3 (ref 0–0.4)
EOSINOPHIL NFR BLD AUTO: 1.4 % (ref 0.3–6.2)
ERYTHROCYTE [DISTWIDTH] IN BLOOD BY AUTOMATED COUNT: 13.6 % (ref 12.3–15.4)
ERYTHROCYTE [SEDIMENTATION RATE] IN BLOOD: 44 MM/HR (ref 0–15)
FERRITIN SERPL-MCNC: 555.5 NG/ML (ref 30–400)
FERRITIN SERPL-MCNC: 612 NG/ML (ref 30–400)
FLUAV RNA RESP QL NAA+PROBE: NOT DETECTED
FLUBV RNA RESP QL NAA+PROBE: NOT DETECTED
GFR SERPL CREATININE-BSD FRML MDRD: 65 ML/MIN/1.73
GLOBULIN UR ELPH-MCNC: 3.6 GM/DL
GLUCOSE SERPL-MCNC: 101 MG/DL (ref 65–99)
GLUCOSE UR STRIP-MCNC: NEGATIVE MG/DL
GRAN CASTS URNS QL MICRO: ABNORMAL /LPF
HCT VFR BLD AUTO: 46.5 % (ref 37.5–51)
HGB BLD-MCNC: 15.4 G/DL (ref 13–17.7)
HGB UR QL STRIP.AUTO: ABNORMAL
HOLD SPECIMEN: NORMAL
HYALINE CASTS UR QL AUTO: ABNORMAL /LPF
IMM GRANULOCYTES # BLD AUTO: 0.01 10*3/MM3 (ref 0–0.05)
IMM GRANULOCYTES NFR BLD AUTO: 0.3 % (ref 0–0.5)
KETONES UR QL STRIP: ABNORMAL
LDH SERPL-CCNC: 231 U/L (ref 135–225)
LDH SERPL-CCNC: 244 U/L (ref 135–225)
LEUKOCYTE ESTERASE UR QL STRIP.AUTO: NEGATIVE
LYMPHOCYTES # BLD AUTO: 1.2 10*3/MM3 (ref 0.7–3.1)
LYMPHOCYTES NFR BLD AUTO: 33.2 % (ref 19.6–45.3)
MCH RBC QN AUTO: 27.8 PG (ref 26.6–33)
MCHC RBC AUTO-ENTMCNC: 33.1 G/DL (ref 31.5–35.7)
MCV RBC AUTO: 83.9 FL (ref 79–97)
MONOCYTES # BLD AUTO: 0.28 10*3/MM3 (ref 0.1–0.9)
MONOCYTES NFR BLD AUTO: 7.8 % (ref 5–12)
MUCOUS THREADS URNS QL MICRO: ABNORMAL /HPF
NEUTROPHILS NFR BLD AUTO: 2.06 10*3/MM3 (ref 1.7–7)
NEUTROPHILS NFR BLD AUTO: 57 % (ref 42.7–76)
NITRITE UR QL STRIP: NEGATIVE
NRBC BLD AUTO-RTO: 0 /100 WBC (ref 0–0.2)
NT-PROBNP SERPL-MCNC: 8.5 PG/ML (ref 0–450)
NT-PROBNP SERPL-MCNC: 8.7 PG/ML (ref 0–450)
PH UR STRIP.AUTO: 5.5 [PH] (ref 5–8)
PLATELET # BLD AUTO: 139 10*3/MM3 (ref 140–450)
PMV BLD AUTO: 10.5 FL (ref 6–12)
POTASSIUM SERPL-SCNC: 3.8 MMOL/L (ref 3.5–5.2)
PROCALCITONIN SERPL-MCNC: 0.13 NG/ML (ref 0–0.25)
PROT SERPL-MCNC: 7.4 G/DL (ref 6–8.5)
PROT UR QL STRIP: ABNORMAL
QT INTERVAL: 392 MS
QTC INTERVAL: 460 MS
RBC # BLD AUTO: 5.54 10*6/MM3 (ref 4.14–5.8)
RBC # UR: ABNORMAL /HPF
REF LAB TEST METHOD: ABNORMAL
SARS-COV-2 RNA RESP QL NAA+PROBE: DETECTED
SODIUM SERPL-SCNC: 138 MMOL/L (ref 136–145)
SP GR UR STRIP: 1.03 (ref 1–1.03)
SQUAMOUS #/AREA URNS HPF: ABNORMAL /HPF
TROPONIN T SERPL-MCNC: <0.01 NG/ML (ref 0–0.03)
UROBILINOGEN UR QL STRIP: ABNORMAL
WBC # BLD AUTO: 3.61 10*3/MM3 (ref 3.4–10.8)
WBC UR QL AUTO: ABNORMAL /HPF
WHOLE BLOOD HOLD SPECIMEN: NORMAL
WHOLE BLOOD HOLD SPECIMEN: NORMAL

## 2021-02-11 PROCEDURE — 83520 IMMUNOASSAY QUANT NOS NONAB: CPT | Performed by: NURSE PRACTITIONER

## 2021-02-11 PROCEDURE — 84145 PROCALCITONIN (PCT): CPT | Performed by: NURSE PRACTITIONER

## 2021-02-11 PROCEDURE — 25010000002 AZITHROMYCIN PER 500 MG: Performed by: NURSE PRACTITIONER

## 2021-02-11 PROCEDURE — 80053 COMPREHEN METABOLIC PANEL: CPT | Performed by: EMERGENCY MEDICINE

## 2021-02-11 PROCEDURE — XW033E5 INTRODUCTION OF REMDESIVIR ANTI-INFECTIVE INTO PERIPHERAL VEIN, PERCUTANEOUS APPROACH, NEW TECHNOLOGY GROUP 5: ICD-10-PCS | Performed by: FAMILY MEDICINE

## 2021-02-11 PROCEDURE — 85025 COMPLETE CBC W/AUTO DIFF WBC: CPT | Performed by: EMERGENCY MEDICINE

## 2021-02-11 PROCEDURE — 71275 CT ANGIOGRAPHY CHEST: CPT

## 2021-02-11 PROCEDURE — 83605 ASSAY OF LACTIC ACID: CPT | Performed by: NURSE PRACTITIONER

## 2021-02-11 PROCEDURE — 80053 COMPREHEN METABOLIC PANEL: CPT | Performed by: NURSE PRACTITIONER

## 2021-02-11 PROCEDURE — 25010000002 CEFTRIAXONE PER 250 MG: Performed by: NURSE PRACTITIONER

## 2021-02-11 PROCEDURE — 85652 RBC SED RATE AUTOMATED: CPT | Performed by: FAMILY MEDICINE

## 2021-02-11 PROCEDURE — 83880 ASSAY OF NATRIURETIC PEPTIDE: CPT | Performed by: FAMILY MEDICINE

## 2021-02-11 PROCEDURE — 86140 C-REACTIVE PROTEIN: CPT | Performed by: FAMILY MEDICINE

## 2021-02-11 PROCEDURE — 82728 ASSAY OF FERRITIN: CPT | Performed by: NURSE PRACTITIONER

## 2021-02-11 PROCEDURE — 87636 SARSCOV2 & INF A&B AMP PRB: CPT | Performed by: NURSE PRACTITIONER

## 2021-02-11 PROCEDURE — 83880 ASSAY OF NATRIURETIC PEPTIDE: CPT | Performed by: EMERGENCY MEDICINE

## 2021-02-11 PROCEDURE — 85379 FIBRIN DEGRADATION QUANT: CPT | Performed by: FAMILY MEDICINE

## 2021-02-11 PROCEDURE — 85379 FIBRIN DEGRADATION QUANT: CPT | Performed by: NURSE PRACTITIONER

## 2021-02-11 PROCEDURE — 0 IOPAMIDOL PER 1 ML: Performed by: EMERGENCY MEDICINE

## 2021-02-11 PROCEDURE — 86140 C-REACTIVE PROTEIN: CPT | Performed by: NURSE PRACTITIONER

## 2021-02-11 PROCEDURE — 83615 LACTATE (LD) (LDH) ENZYME: CPT | Performed by: FAMILY MEDICINE

## 2021-02-11 PROCEDURE — 25010000002 DEXAMETHASONE PER 1 MG: Performed by: NURSE PRACTITIONER

## 2021-02-11 PROCEDURE — 81001 URINALYSIS AUTO W/SCOPE: CPT | Performed by: NURSE PRACTITIONER

## 2021-02-11 PROCEDURE — 25010000002 ENOXAPARIN PER 10 MG: Performed by: NURSE PRACTITIONER

## 2021-02-11 PROCEDURE — 82728 ASSAY OF FERRITIN: CPT | Performed by: FAMILY MEDICINE

## 2021-02-11 PROCEDURE — 99223 1ST HOSP IP/OBS HIGH 75: CPT | Performed by: FAMILY MEDICINE

## 2021-02-11 PROCEDURE — 87040 BLOOD CULTURE FOR BACTERIA: CPT | Performed by: NURSE PRACTITIONER

## 2021-02-11 PROCEDURE — 99284 EMERGENCY DEPT VISIT MOD MDM: CPT

## 2021-02-11 PROCEDURE — 84484 ASSAY OF TROPONIN QUANT: CPT | Performed by: EMERGENCY MEDICINE

## 2021-02-11 PROCEDURE — 82550 ASSAY OF CK (CPK): CPT | Performed by: NURSE PRACTITIONER

## 2021-02-11 PROCEDURE — 83615 LACTATE (LD) (LDH) ENZYME: CPT | Performed by: NURSE PRACTITIONER

## 2021-02-11 PROCEDURE — 93005 ELECTROCARDIOGRAM TRACING: CPT | Performed by: EMERGENCY MEDICINE

## 2021-02-11 RX ORDER — DEXAMETHASONE SODIUM PHOSPHATE 4 MG/ML
6 INJECTION, SOLUTION INTRA-ARTICULAR; INTRALESIONAL; INTRAMUSCULAR; INTRAVENOUS; SOFT TISSUE
Status: DISCONTINUED | OUTPATIENT
Start: 2021-02-12 | End: 2021-02-15 | Stop reason: HOSPADM

## 2021-02-11 RX ORDER — LISINOPRIL 10 MG/1
40 TABLET ORAL DAILY
Status: DISCONTINUED | OUTPATIENT
Start: 2021-02-12 | End: 2021-02-15 | Stop reason: HOSPADM

## 2021-02-11 RX ORDER — DEXTROMETHORPHAN POLISTIREX 30 MG/5ML
60 SUSPENSION ORAL EVERY 12 HOURS PRN
Status: DISCONTINUED | OUTPATIENT
Start: 2021-02-11 | End: 2021-02-15 | Stop reason: HOSPADM

## 2021-02-11 RX ORDER — SODIUM CHLORIDE 0.9 % (FLUSH) 0.9 %
10 SYRINGE (ML) INJECTION AS NEEDED
Status: DISCONTINUED | OUTPATIENT
Start: 2021-02-11 | End: 2021-02-15 | Stop reason: HOSPADM

## 2021-02-11 RX ORDER — GUAIFENESIN AND CODEINE PHOSPHATE 100; 10 MG/5ML; MG/5ML
5 SOLUTION ORAL 3 TIMES DAILY PRN
Status: DISCONTINUED | OUTPATIENT
Start: 2021-02-11 | End: 2021-02-13

## 2021-02-11 RX ORDER — PANTOPRAZOLE SODIUM 40 MG/1
40 TABLET, DELAYED RELEASE ORAL EVERY MORNING
Status: DISCONTINUED | OUTPATIENT
Start: 2021-02-12 | End: 2021-02-15 | Stop reason: HOSPADM

## 2021-02-11 RX ORDER — DEXAMETHASONE SODIUM PHOSPHATE 4 MG/ML
6 INJECTION, SOLUTION INTRA-ARTICULAR; INTRALESIONAL; INTRAMUSCULAR; INTRAVENOUS; SOFT TISSUE ONCE
Status: COMPLETED | OUTPATIENT
Start: 2021-02-11 | End: 2021-02-11

## 2021-02-11 RX ORDER — BENZONATATE 100 MG/1
100 CAPSULE ORAL 3 TIMES DAILY PRN
Status: DISCONTINUED | OUTPATIENT
Start: 2021-02-11 | End: 2021-02-15 | Stop reason: HOSPADM

## 2021-02-11 RX ORDER — CETIRIZINE HYDROCHLORIDE 10 MG/1
10 TABLET ORAL DAILY
Status: DISCONTINUED | OUTPATIENT
Start: 2021-02-12 | End: 2021-02-15 | Stop reason: HOSPADM

## 2021-02-11 RX ADMIN — ENOXAPARIN SODIUM 40 MG: 40 INJECTION SUBCUTANEOUS at 22:06

## 2021-02-11 RX ADMIN — SODIUM CHLORIDE 1 G: 900 INJECTION INTRAVENOUS at 20:41

## 2021-02-11 RX ADMIN — REMDESIVIR 200 MG: 100 INJECTION, POWDER, LYOPHILIZED, FOR SOLUTION INTRAVENOUS at 23:12

## 2021-02-11 RX ADMIN — AZITHROMYCIN 500 MG: 500 INJECTION, POWDER, LYOPHILIZED, FOR SOLUTION INTRAVENOUS at 21:21

## 2021-02-11 RX ADMIN — IOPAMIDOL 85 ML: 755 INJECTION, SOLUTION INTRAVENOUS at 19:46

## 2021-02-11 RX ADMIN — DEXAMETHASONE SODIUM PHOSPHATE 6 MG: 4 INJECTION, SOLUTION INTRA-ARTICULAR; INTRALESIONAL; INTRAMUSCULAR; INTRAVENOUS; SOFT TISSUE at 20:43

## 2021-02-11 RX ADMIN — SODIUM CHLORIDE 500 ML: 9 INJECTION, SOLUTION INTRAVENOUS at 19:30

## 2021-02-11 NOTE — TELEPHONE ENCOUNTER
PT CALLED REQUESTING A CALL BACK REGARDING AN ANTIBIOTIC IV    PT DID NOT GO INTO FURTHER DETAIL AND IS REQUESTING TO SPEAK TO GHADA    PLEASE ADVISE AT: [phone]

## 2021-02-11 NOTE — ED PROVIDER NOTES
Subjective   Catracho Sahu is a 50 y.o. male who presents to the ED with c/o COVID-19 positive. The patient reports he began feeling ill 7 days ago, prompting him to get tested for COVID-19. 6 days ago he was informed of a positive test result and has been quarantining since then. For the past day the patient has had worsening exertional shortness of breath with a cough and myalgias. His symptoms have not been improving which prompted him to discuss the case with his primary care provider who advised him to have a BAM infusion. The patient has not gotten this infusion yet and due to his symptoms he presents to the ED for evaluation. He complains of loss of taste and smell and diarrhea but denies nausea or vomiting. The patient has no prior diagnosis of lung disease, myocardial infarction, stroke, cardiac catheterization, or blood clots. He is not a smoker and he does not use supplemental oxygen anymore. There are no other acute complaints at this time.      History provided by:  Patient  Shortness of Breath  Severity:  Moderate  Onset quality:  Sudden  Duration:  1 day  Timing:  Constant  Progression:  Worsening  Chronicity:  New  Context: URI    Relieved by:  Nothing  Worsened by:  Exertion  Ineffective treatments:  Rest and lying down  Associated symptoms: cough    Associated symptoms: no abdominal pain, no chest pain, no fever and no vomiting    Risk factors: no recent alcohol use, no hx of cancer, no hx of PE/DVT, no recent surgery and no tobacco use        Review of Systems   Constitutional: Negative for chills and fever.   HENT:        The patient complains of loss of taste and smell.   Respiratory: Positive for cough and shortness of breath.    Cardiovascular: Negative for chest pain.   Gastrointestinal: Positive for diarrhea. Negative for abdominal pain, nausea and vomiting.   Musculoskeletal: Positive for myalgias.   All other systems reviewed and are negative.      Past Medical History:   Diagnosis  Date   • Fractures    • Hypertension    • Sleep apnea        No Known Allergies    Past Surgical History:   Procedure Laterality Date   • COLONOSCOPY  2008   • TONSILLECTOMY  2002   • UVULOPALATOPHARYNGOPLASTY         Family History   Problem Relation Age of Onset   • Migraines Mother    • Hypertension Father    • Stroke Father    • Hypertension Paternal Aunt    • Cancer Maternal Grandmother    • Parkinsonism Maternal Grandfather    • Heart attack Paternal Grandmother    • Hypertension Paternal Grandmother    • Dementia Paternal Grandfather    • Stroke Paternal Grandfather        Social History     Socioeconomic History   • Marital status:      Spouse name: Not on file   • Number of children: Not on file   • Years of education: Not on file   • Highest education level: Not on file   Tobacco Use   • Smoking status: Never Smoker   • Smokeless tobacco: Never Used   Substance and Sexual Activity   • Alcohol use: No   • Drug use: No   • Sexual activity: Defer         Objective   Physical Exam  Vitals signs and nursing note reviewed.   Constitutional:       General: He is not in acute distress.     Appearance: He is well-developed. He is obese.      Comments: Morbidly obese.   HENT:      Head: Normocephalic and atraumatic.   Eyes:      General: No scleral icterus.     Conjunctiva/sclera: Conjunctivae normal.   Neck:      Musculoskeletal: Normal range of motion and neck supple.   Cardiovascular:      Rate and Rhythm: Normal rate and regular rhythm.      Heart sounds: Normal heart sounds. No murmur.      Comments: No peripheral edema.  No tachycardia.  Pulmonary:      Effort: Pulmonary effort is normal. No respiratory distress.      Breath sounds: Normal breath sounds.      Comments: The patient was initially hypoxic with an oxygen saturation of 89% on room air but he was put on 4 L of oxygen and now he is 96% without respiratory distress.  Abdominal:      Palpations: Abdomen is soft.      Tenderness: There is no  abdominal tenderness.   Musculoskeletal: Normal range of motion.      Right lower leg: No edema.      Left lower leg: No edema.   Skin:     General: Skin is warm and dry.   Neurological:      Mental Status: He is alert and oriented to person, place, and time.   Psychiatric:         Behavior: Behavior normal.         Procedures         ED Course  ED Course as of Feb 11 2101   Thu Feb 11, 2021 2056 Mr. Sahu work-up is remarkable for COVID-19 detection in addition to hypoxia at triage on room air.  CT imaging shows bilateral pneumonia.  Antibiotics and Decadron have been initiated.  Dr. Teena Park accepts inpatient care.  Patient understands and concurs with this inpatient plan of care.      [MS]      ED Course User Index  [MS] Callie Etienne, FÉLIX     Recent Results (from the past 24 hour(s))   Comprehensive Metabolic Panel    Collection Time: 02/11/21  5:45 PM    Specimen: Blood   Result Value Ref Range    Glucose 101 (H) 65 - 99 mg/dL    BUN 13 6 - 20 mg/dL    Creatinine 1.20 0.76 - 1.27 mg/dL    Sodium 138 136 - 145 mmol/L    Potassium 3.8 3.5 - 5.2 mmol/L    Chloride 103 98 - 107 mmol/L    CO2 26.0 22.0 - 29.0 mmol/L    Calcium 8.7 8.6 - 10.5 mg/dL    Total Protein 7.4 6.0 - 8.5 g/dL    Albumin 3.80 3.50 - 5.20 g/dL    ALT (SGPT) 22 1 - 41 U/L    AST (SGOT) 34 1 - 40 U/L    Alkaline Phosphatase 66 39 - 117 U/L    Total Bilirubin 0.7 0.0 - 1.2 mg/dL    eGFR Non African Amer 65 >60 mL/min/1.73    Globulin 3.6 gm/dL    A/G Ratio 1.1 g/dL    BUN/Creatinine Ratio 10.8 7.0 - 25.0    Anion Gap 9.0 5.0 - 15.0 mmol/L   BNP    Collection Time: 02/11/21  5:45 PM    Specimen: Blood   Result Value Ref Range    proBNP 8.5 0.0 - 450.0 pg/mL   Troponin    Collection Time: 02/11/21  5:45 PM    Specimen: Blood   Result Value Ref Range    Troponin T <0.010 0.000 - 0.030 ng/mL   Light Blue Top    Collection Time: 02/11/21  5:45 PM   Result Value Ref Range    Extra Tube hold for add-on    Green Top (Gel)    Collection Time:  02/11/21  5:45 PM   Result Value Ref Range    Extra Tube Hold for add-ons.    Lavender Top    Collection Time: 02/11/21  5:45 PM   Result Value Ref Range    Extra Tube hold for add-on    Gold Top - SST    Collection Time: 02/11/21  5:45 PM   Result Value Ref Range    Extra Tube Hold for add-ons.    Gray Top - Ice    Collection Time: 02/11/21  5:45 PM   Result Value Ref Range    Extra Tube Hold for add-ons.    CBC Auto Differential    Collection Time: 02/11/21  5:45 PM    Specimen: Blood   Result Value Ref Range    WBC 3.61 3.40 - 10.80 10*3/mm3    RBC 5.54 4.14 - 5.80 10*6/mm3    Hemoglobin 15.4 13.0 - 17.7 g/dL    Hematocrit 46.5 37.5 - 51.0 %    MCV 83.9 79.0 - 97.0 fL    MCH 27.8 26.6 - 33.0 pg    MCHC 33.1 31.5 - 35.7 g/dL    RDW 13.6 12.3 - 15.4 %    RDW-SD 42.4 37.0 - 54.0 fl    MPV 10.5 6.0 - 12.0 fL    Platelets 139 (L) 140 - 450 10*3/mm3    Neutrophil % 57.0 42.7 - 76.0 %    Lymphocyte % 33.2 19.6 - 45.3 %    Monocyte % 7.8 5.0 - 12.0 %    Eosinophil % 1.4 0.3 - 6.2 %    Basophil % 0.3 0.0 - 1.5 %    Immature Grans % 0.3 0.0 - 0.5 %    Neutrophils, Absolute 2.06 1.70 - 7.00 10*3/mm3    Lymphocytes, Absolute 1.20 0.70 - 3.10 10*3/mm3    Monocytes, Absolute 0.28 0.10 - 0.90 10*3/mm3    Eosinophils, Absolute 0.05 0.00 - 0.40 10*3/mm3    Basophils, Absolute 0.01 0.00 - 0.20 10*3/mm3    Immature Grans, Absolute 0.01 0.00 - 0.05 10*3/mm3    nRBC 0.0 0.0 - 0.2 /100 WBC   Lactic Acid, Plasma    Collection Time: 02/11/21  5:45 PM    Specimen: Blood   Result Value Ref Range    Lactate 0.8 0.5 - 2.0 mmol/L   Sedimentation Rate    Collection Time: 02/11/21  5:45 PM    Specimen: Blood   Result Value Ref Range    Sed Rate 44 (H) 0 - 15 mm/hr   COVID-19 and FLU A/B PCR - Swab, Nasopharynx    Collection Time: 02/11/21  6:24 PM    Specimen: Nasopharynx; Swab   Result Value Ref Range    COVID19 Detected (C) Not Detected - Ref. Range    Influenza A PCR Not Detected Not Detected    Influenza B PCR Not Detected Not Detected    Urinalysis With Microscopic If Indicated (No Culture) - Urine, Clean Catch    Collection Time: 02/11/21  7:50 PM    Specimen: Urine, Clean Catch   Result Value Ref Range    Color, UA Dark Yellow (A) Yellow, Straw    Appearance, UA Clear Clear    pH, UA 5.5 5.0 - 8.0    Specific Gravity, UA 1.035 (H) 1.001 - 1.030    Glucose, UA Negative Negative    Ketones, UA Trace (A) Negative    Bilirubin, UA Small (1+) (A) Negative    Blood, UA Trace (A) Negative    Protein,  mg/dL (2+) (A) Negative    Leuk Esterase, UA Negative Negative    Nitrite, UA Negative Negative    Urobilinogen, UA 1.0 E.U./dL 0.2 - 1.0 E.U./dL   Urinalysis, Microscopic Only - Urine, Clean Catch    Collection Time: 02/11/21  7:50 PM    Specimen: Urine, Clean Catch   Result Value Ref Range    RBC, UA 7-12 (A) None Seen, 0-2 /HPF    WBC, UA 3-5 (A) None Seen, 0-2 /HPF    Bacteria, UA None Seen None Seen, Trace /HPF    Squamous Epithelial Cells, UA 0-2 None Seen, 0-2 /HPF    Hyaline Casts, UA 13-20 0 - 6 /LPF    Granular Casts, UA 7-12 None Seen /LPF    Mucus, UA Moderate/2+ (A) None Seen, Trace /HPF    Methodology Manual Light Microscopy      Note: In addition to lab results from this visit, the labs listed above may include labs taken at another facility or during a different encounter within the last 24 hours. Please correlate lab times with ED admission and discharge times for further clarification of the services performed during this visit.    CT Angiogram Chest   Final Result   Moderately extensive bilateral pneumonia. No evidence of pulmonary embolism.      Signer Name: Elia Bowie MD    Signed: 2/11/2021 8:08 PM    Workstation Name: [id number]     [facility]        Vitals:    02/11/21 1735 02/11/21 1742 02/11/21 1800 02/11/21 2015   BP:   140/90 136/81   BP Location:   Left arm    Patient Position:   Sitting    Pulse:  89 94 84   Resp:   18    Temp:       SpO2:  (!) 89% 95% 92%   Weight: (!) 204 kg (450 lb)     "  Height: 182.9 cm (72\")        Medications   sodium chloride 0.9 % flush 10 mL (has no administration in time range)   cefTRIAXone (ROCEPHIN) 1 g/100 mL 0.9% NS (MBP) (1 g Intravenous New Bag 2/11/21 2041)   AZITHROMYCIN 500 MG/250 ML 0.9% NS IVPB (vial-mate) (500 mg Intravenous New Bag 2/11/21 2045)   sodium chloride 0.9 % bolus 500 mL (0 mL Intravenous Stopped 2/11/21 2042)   iopamidol (ISOVUE-370) 76 % injection 100 mL (85 mL Intravenous Given 2/11/21 1946)   dexamethasone (DECADRON) injection 6 mg (6 mg Intravenous Given 2/11/21 2043)     ECG/EMG Results (last 24 hours)     Procedure Component Value Units Date/Time    ECG 12 Lead [[id number]] Collected: 02/11/21 1751     Updated: 02/11/21 1752        ECG 12 Lead                                                    MDM    Final diagnoses:   Pneumonia due to COVID-19 virus   History of hypertension     ADMITTED     Documentation assistance provided by reva Pritchard.  Information recorded by the reva was done at my direction and has been verified and validated by me.     Nick Pritchard  02/11/21 1812       Callie Etienne APRN  02/11/21 2101    "

## 2021-02-11 NOTE — TELEPHONE ENCOUNTER
pharmacy called to follow up on the antibody IV, they got the referral but need a positive PCR test sent over to them asap since they need to get him started on IV within 10 days of his symptoms and Saturday would be the last day they can get him started so if they can get an update by tomorrow. Thank you.   Call back number [phone]

## 2021-02-12 ENCOUNTER — TELEPHONE (OUTPATIENT)
Dept: INTERNAL MEDICINE | Facility: CLINIC | Age: 51
End: 2021-02-12

## 2021-02-12 PROBLEM — G47.33 OSA (OBSTRUCTIVE SLEEP APNEA): Status: ACTIVE | Noted: 2021-02-12

## 2021-02-12 LAB
ALBUMIN SERPL-MCNC: 3.5 G/DL (ref 3.5–5.2)
ALBUMIN/GLOB SERPL: 1 G/DL
ALP SERPL-CCNC: 61 U/L (ref 39–117)
ALT SERPL W P-5'-P-CCNC: 22 U/L (ref 1–41)
ANION GAP SERPL CALCULATED.3IONS-SCNC: 10 MMOL/L (ref 5–15)
AST SERPL-CCNC: 35 U/L (ref 1–40)
BASOPHILS # BLD AUTO: 0.01 10*3/MM3 (ref 0–0.2)
BASOPHILS NFR BLD AUTO: 0.3 % (ref 0–1.5)
BILIRUB SERPL-MCNC: 0.5 MG/DL (ref 0–1.2)
BUN SERPL-MCNC: 14 MG/DL (ref 6–20)
BUN/CREAT SERPL: 12.1 (ref 7–25)
CALCIUM SPEC-SCNC: 8.4 MG/DL (ref 8.6–10.5)
CHLORIDE SERPL-SCNC: 104 MMOL/L (ref 98–107)
CK SERPL-CCNC: 201 U/L (ref 20–200)
CO2 SERPL-SCNC: 24 MMOL/L (ref 22–29)
CREAT SERPL-MCNC: 1.16 MG/DL (ref 0.76–1.27)
CRP SERPL-MCNC: 2.62 MG/DL (ref 0–0.5)
D DIMER PPP FEU-MCNC: 0.51 MCGFEU/ML (ref 0–0.56)
DEPRECATED RDW RBC AUTO: 43.1 FL (ref 37–54)
EOSINOPHIL # BLD AUTO: 0.01 10*3/MM3 (ref 0–0.4)
EOSINOPHIL NFR BLD AUTO: 0.3 % (ref 0.3–6.2)
ERYTHROCYTE [DISTWIDTH] IN BLOOD BY AUTOMATED COUNT: 13.7 % (ref 12.3–15.4)
FERRITIN SERPL-MCNC: 553.5 NG/ML (ref 30–400)
FIBRINOGEN PPP-MCNC: 554 MG/DL (ref 220–470)
GFR SERPL CREATININE-BSD FRML MDRD: 67 ML/MIN/1.73
GLOBULIN UR ELPH-MCNC: 3.5 GM/DL
GLUCOSE SERPL-MCNC: 136 MG/DL (ref 65–99)
HCT VFR BLD AUTO: 43.5 % (ref 37.5–51)
HGB BLD-MCNC: 14.5 G/DL (ref 13–17.7)
IMM GRANULOCYTES # BLD AUTO: 0 10*3/MM3 (ref 0–0.05)
IMM GRANULOCYTES NFR BLD AUTO: 0 % (ref 0–0.5)
L PNEUMO1 AG UR QL IA: NEGATIVE
LDH SERPL-CCNC: 210 U/L (ref 135–225)
LYMPHOCYTES # BLD AUTO: 0.69 10*3/MM3 (ref 0.7–3.1)
LYMPHOCYTES NFR BLD AUTO: 23.2 % (ref 19.6–45.3)
MCH RBC QN AUTO: 28.5 PG (ref 26.6–33)
MCHC RBC AUTO-ENTMCNC: 33.3 G/DL (ref 31.5–35.7)
MCV RBC AUTO: 85.5 FL (ref 79–97)
MONOCYTES # BLD AUTO: 0.19 10*3/MM3 (ref 0.1–0.9)
MONOCYTES NFR BLD AUTO: 6.4 % (ref 5–12)
NEUTROPHILS NFR BLD AUTO: 2.08 10*3/MM3 (ref 1.7–7)
NEUTROPHILS NFR BLD AUTO: 69.8 % (ref 42.7–76)
NRBC BLD AUTO-RTO: 0 /100 WBC (ref 0–0.2)
PLATELET # BLD AUTO: 140 10*3/MM3 (ref 140–450)
PMV BLD AUTO: 10.2 FL (ref 6–12)
POTASSIUM SERPL-SCNC: 3.7 MMOL/L (ref 3.5–5.2)
PROT SERPL-MCNC: 7 G/DL (ref 6–8.5)
RBC # BLD AUTO: 5.09 10*6/MM3 (ref 4.14–5.8)
S PNEUM AG SPEC QL LA: NEGATIVE
SODIUM SERPL-SCNC: 138 MMOL/L (ref 136–145)
WBC # BLD AUTO: 2.98 10*3/MM3 (ref 3.4–10.8)

## 2021-02-12 PROCEDURE — 85025 COMPLETE CBC W/AUTO DIFF WBC: CPT | Performed by: NURSE PRACTITIONER

## 2021-02-12 PROCEDURE — 25010000002 ENOXAPARIN PER 10 MG: Performed by: INTERNAL MEDICINE

## 2021-02-12 PROCEDURE — 63710000001 DEXAMETHASONE PER 0.25 MG: Performed by: NURSE PRACTITIONER

## 2021-02-12 PROCEDURE — 99233 SBSQ HOSP IP/OBS HIGH 50: CPT | Performed by: INTERNAL MEDICINE

## 2021-02-12 PROCEDURE — 82728 ASSAY OF FERRITIN: CPT | Performed by: NURSE PRACTITIONER

## 2021-02-12 PROCEDURE — 85379 FIBRIN DEGRADATION QUANT: CPT | Performed by: NURSE PRACTITIONER

## 2021-02-12 PROCEDURE — 87899 AGENT NOS ASSAY W/OPTIC: CPT | Performed by: NURSE PRACTITIONER

## 2021-02-12 PROCEDURE — 83615 LACTATE (LD) (LDH) ENZYME: CPT | Performed by: NURSE PRACTITIONER

## 2021-02-12 PROCEDURE — 85384 FIBRINOGEN ACTIVITY: CPT | Performed by: NURSE PRACTITIONER

## 2021-02-12 RX ADMIN — CETIRIZINE HYDROCHLORIDE 10 MG: 10 TABLET, FILM COATED ORAL at 09:32

## 2021-02-12 RX ADMIN — BENZONATATE 100 MG: 100 CAPSULE ORAL at 21:27

## 2021-02-12 RX ADMIN — PANTOPRAZOLE SODIUM 40 MG: 40 TABLET, DELAYED RELEASE ORAL at 07:00

## 2021-02-12 RX ADMIN — SODIUM CHLORIDE, PRESERVATIVE FREE 10 ML: 5 INJECTION INTRAVENOUS at 09:32

## 2021-02-12 RX ADMIN — ENOXAPARIN SODIUM 40 MG: 40 INJECTION SUBCUTANEOUS at 21:27

## 2021-02-12 RX ADMIN — LISINOPRIL 40 MG: 10 TABLET ORAL at 09:32

## 2021-02-12 RX ADMIN — ENOXAPARIN SODIUM 40 MG: 40 INJECTION SUBCUTANEOUS at 12:31

## 2021-02-12 RX ADMIN — DEXAMETHASONE 6 MG: 4 TABLET ORAL at 09:32

## 2021-02-12 RX ADMIN — REMDESIVIR 100 MG: 100 INJECTION, POWDER, LYOPHILIZED, FOR SOLUTION INTRAVENOUS at 18:20

## 2021-02-12 NOTE — PROGRESS NOTES
Discharge Planning Assessment  [facility]     Patient Name: Catracho Sahu  MRN: [id number]  Today's Date: 2/12/2021    Admit Date: 2/11/2021    Discharge Needs Assessment     Row Name 02/12/21 1249       Living Environment    Lives With  alone    Current Living Arrangements  home/apartment/condo    Primary Care Provided by  self    Provides Primary Care For  no one    Quality of Family Relationships  helpful    Able to Return to Prior Arrangements  yes       Resource/Environmental Concerns    Resource/Environmental Concerns  none    Transportation Concerns  car, none       Transition Planning    Patient/Family Anticipates Transition to  home    Patient/Family Anticipated Services at Transition  none       Discharge Needs Assessment    Readmission Within the Last 30 Days  no previous admission in last 30 days    Equipment Currently Used at Home  none    Concerns to be Addressed  discharge planning    Anticipated Changes Related to Illness  none    Equipment Needed After Discharge  none        Discharge Plan     Row Name 02/12/21 1250       Plan    Plan  Initial CM eval    Patient/Family in Agreement with Plan  yes    Plan Comments  Talked to patient regarding discharge planning- confirmed he lives in [facility] and is independent of ADLs. Currently on 3L of 02 and has been started on Remdesivir. Goal is to return home upon discharge - CM will continue to follow for discharge planning - celsa [phone]    Final Discharge Disposition Code  01 - home or self-care        Continued Care and Services - Admitted Since 2/11/2021    Coordination has not been started for this encounter.         Demographic Summary     Row Name 02/12/21 1248       General Information    Admission Type  inpatient    Arrived From  home    Referral Source  admission list    Reason for Consult  discharge planning    Preferred Language  English       Contact Information    Permission Granted to Share Info With           Functional Status     Row Name 02/12/21 1248       Functional Status    Usual Activity Tolerance  good    Current Activity Tolerance  good       Functional Status, IADL    Medications  independent    Meal Preparation  independent    Housekeeping  independent    Laundry  independent    Shopping  independent        Psychosocial    No documentation.       Abuse/Neglect    No documentation.       Legal    No documentation.       Substance Abuse    No documentation.       Patient Forms    No documentation.           Tawny Holman RN

## 2021-02-12 NOTE — TELEPHONE ENCOUNTER
DAMARIS WITH [facility] STATED SHE RECEIVED A REFERRAL FOR AN INFUSION FOR THE PATIENT.    HOWEVER THE PATIENT DOES NOT MEET CRITERIA.    PCR COVID TEST IS REQUIRED AND PATIENT GOT A RAPID COVID TEST.    ANY QUESTIONS AND OR CONCERNS PLEASE CALL DAMARIS [phone]    PLEASE ADVISE AND INFORM THE PATIENT [phone]

## 2021-02-12 NOTE — TELEPHONE ENCOUNTER
Was out of the office yesterday due to weather. I have printed his results from my email and faxed back to the pharmacy.

## 2021-02-12 NOTE — PLAN OF CARE
Goal Outcome Evaluation:     Progress: improving  Outcome Summary: VSS. Pt remains NSR and on 3L NC. Continues to use IS. No complaints at this time.

## 2021-02-12 NOTE — PROGRESS NOTES
[facility] Medicine Services  PROGRESS NOTE    Patient Name: Catracho Sahu  : 1970  MRN: [id number]    Date of Admission: 2021  Primary Care Physician: Vandana Tan MD    Subjective   Subjective     CC:  Follow-up COVID-19 pneumonia    HPI:  Feels better than prior to admission, does not feel significantly different compared to coming in last night.  Stable overnight on 3 L/min nasal cannula.  No acute issues this morning    ROS:  Gen- No fevers, chills  CV- No chest pain, palpitations  Resp-yes cough, dyspnea  GI- No N/V/D, abd pain    Objective   Objective     Vital Signs:   Temp:  [97.7 °F (36.5 °C)-99.1 °F (37.3 °C)] 97.7 °F (36.5 °C)  Heart Rate:  [68-94] 68  Resp:  [18-20] 20  BP: (127-159)/() 159/101        Physical Exam:  With patient's consent, physical exam was conducted via visual telemedicine encounter due to patient's current isolation requirements in the interest of PPE conservation.    Constitutional: No acute distress, awake, alert, nontoxic, obese body habitus  HENT: NCAT, no conjunctival injection  Respiratory: Good effort, nonlabored respirations   Cardiovascular:  tele with NSR  Musculoskeletal: No edema, normal muscle tone and mass for age  Psychiatric: Appropriate affect, good insight and judgement, cooperative  Neurologic: Oriented x 3, movements symmetric BUE and BLE, speech clear and fluent  Skin: No visible rashes, no jaundice seen on exposed skin through window    Results Reviewed:  Results from last 7 days   Lab Units 21  0609 21  1745   WBC 10*3/mm3 2.98* 3.61   HEMOGLOBIN g/dL 14.5 15.4   HEMATOCRIT % 43.5 46.5   PLATELETS 10*3/mm3 140 139*   PROCALCITONIN ng/mL  --  0.13     Results from last 7 days   Lab Units 21  2228 21  1745   SODIUM mmol/L 138 138   POTASSIUM mmol/L 3.7 3.8   CHLORIDE mmol/L 104 103   CO2 mmol/L 24.0 26.0   BUN mg/dL 14 13   CREATININE mg/dL 1.16 1.20   GLUCOSE mg/dL 136*  101*   CALCIUM mg/dL 8.4* 8.7   ALT (SGPT) U/L 22 22   AST (SGOT) U/L 35 34   TROPONIN T ng/mL  --  <0.010   PROBNP pg/mL 8.7 8.5     Estimated Creatinine Clearance: 141 mL/min (by C-G formula based on SCr of 1.16 mg/dL).    Microbiology Results Abnormal     Procedure Component Value - Date/Time    S. Pneumo Ag Urine or CSF - Urine, Urine, Clean Catch [[id number]]  (Normal) Collected: 02/12/21 0448    Lab Status: Final result Specimen: Urine, Clean Catch Updated: 02/12/21 1049     Strep Pneumo Ag Negative    Legionella Antigen, Urine - Urine, Urine, Clean Catch [[id number]]  (Normal) Collected: 02/12/21 0448    Lab Status: Final result Specimen: Urine, Clean Catch Updated: 02/12/21 1049     LEGIONELLA ANTIGEN, URINE Negative    COVID-19 and FLU A/B PCR - Swab, Nasopharynx [[id number]]  (Abnormal) Collected: 02/11/21 1824    Lab Status: Final result Specimen: Swab from Nasopharynx Updated: 02/11/21 1951     COVID19 Detected     Influenza A PCR Not Detected     Influenza B PCR Not Detected    Narrative:      Fact sheet for providers: https://www.fda.gov/media/401478/download    Fact sheet for patients: https://www.fda.gov/media/342865/download    Test performed by PCR.  Influenza A and Influenza B negative results should be considered presumptive in samples that have a positive SARS-CoV-2 result.    Competitive inhibition studies showed that SARS-CoV-2 virus, when present at concentrations above 3.6E+04 copies/mL, can inhibit the detection and amplification of influenza A and influenza B virus RNA if present at or below 1.8E+02 copies/mL or 4.9E+02 copies/mL, respectively, and may lead to false negative influenza virus results. If co-infection with influenza A or influenza B virus is suspected in samples with a positive SARS-CoV-2 result, the sample should be re-tested with another FDA cleared, approved, or authorized influenza test, if influenza virus detection would change clinical management.          Imaging Results  (Last 24 Hours)     Procedure Component Value Units Date/Time    CT Angiogram Chest [[id number]] Collected: 02/11/21 2008     Updated: 02/11/21 2010    Narrative:      CTA Chest    INDICATION:   Hypoxia. Pneumonia. Increased clinical risk for pulmonary emboli.    TECHNIQUE:   CT angiogram of the chest with 100 cc of Isovue-300 IV contrast. 3-D reconstructions were obtained and reviewed.   Radiation dose reduction techniques included automated exposure control or exposure modulation based on body size. Count of known CT and  cardiac nuc med studies performed in previous 12 months: 0.     COMPARISON:   None available.    FINDINGS:   Chest images at mediastinal window show good filling of the pulmonary arteries. There are no pulmonary artery filling defects to suggest emboli. No effusions or adenopathy are noted. No acute findings in the aorta. The CT angiographic images also show no  evidence of emboli.    Chest images at lung window demonstrate multiple patchy ground glass infiltrates bilaterally, are prominent in the lower lung fields than in the upper. Commonly reported imaging features of COVID-19 pneumonia are present. Other processes such as  influenza pneumonia and organizing pneumonia can cause a similar imaging pattern.      Impression:      Moderately extensive bilateral pneumonia. No evidence of pulmonary embolism.    Signer Name: Elia Bowie MD   Signed: 2/11/2021 8:08 PM   Workstation Name: [email or web address]    [facility]               I have reviewed the medications:  Scheduled Meds:cetirizine, 10 mg, Oral, Daily  dexamethasone, 6 mg, Oral, Daily With Breakfast    Or  dexamethasone, 6 mg, Intravenous, Daily With Breakfast  enoxaparin, 40 mg, Subcutaneous, Q12H  lisinopril, 40 mg, Oral, Daily  pantoprazole, 40 mg, Oral, QAM  remdesivir, 100 mg, Intravenous, Daily With Lunch      Continuous Infusions:Pharmacy Consult - Remdesivir,       PRN Meds:.•  benzonatate  •  dextromethorphan  "polistirex ER  •  guaiFENesin-codeine  •  Pharmacy Consult - Remdesivir  •  sodium chloride    Assessment/Plan   Assessment & Plan     Active Hospital Problems    Diagnosis  POA   • **Pneumonia due to COVID-19 virus [U07.1, J12.82]  Yes   • TOREY (obstructive sleep apnea) [G47.33]  Unknown   • Morbid obesity with BMI of 60.0-69.9, adult (CMS/[facility]) [E66.01, Z68.44]  Not Applicable   • Benign essential HTN [I10]  Yes      Resolved Hospital Problems   No resolved problems to display.        Brief Hospital Course to date:  Catracho Sahu is a 50 y.o. male with Hx HTN, TOREY, reportedly testing positive 2/4/2021 who eventually clinically declined and presented to the hospital with fevers and cough, change of taste/smell admitted for management of COVID-19 pneumonia    The following problems new to me today    Assessment/plan    COVID-19 pneumonia with hypoxia  For documentation clarification, NOT respiratory \"failure\"  -CTA with typical Covid findings, no PE  -First pos test reported 2/4/2021, x20 days isolation will be 2/24/2021  -Continue dexamethasone (x10 days will be 2/20/2021)  -Continue remdesivir (x5 days will be 2/15/2021)  -Daily disease progression labs  -Continue O2 support as required  -No evidence for bacterial pneumonia, discontinue CTX/doxycycline    Hypertension  -Continue lisinopril    TOREY  -Hx of correctional surgery    Super morbid obesity  -BMI 61.03 kg/M2    DVT Prophylaxis: Lovenox    I called and updated the patient's father at 1541    Disposition: I expect the patient to be discharged eventually 2/15/2021 after completion of remdesivir if respiratory status stable.    CODE STATUS:   Code Status and Medical Interventions:   Ordered at: 02/11/21 8923     Level Of Support Discussed With:    Patient     Code Status:    CPR     Medical Interventions (Level of Support Prior to Arrest):    Jim López,   02/12/21              "

## 2021-02-12 NOTE — PLAN OF CARE
Goal Outcome Evaluation:  Plan of Care Reviewed With: patient  Progress: no change  Outcome Summary: VSS.  SOA with exertion.  NSR, 3L NC.  1st Remdesivir given.  No c/o pain.  Will continue to monitor.
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
## 2021-02-13 LAB
ALBUMIN SERPL-MCNC: 3.3 G/DL (ref 3.5–5.2)
ALBUMIN/GLOB SERPL: 1 G/DL
ALP SERPL-CCNC: 58 U/L (ref 39–117)
ALT SERPL W P-5'-P-CCNC: 21 U/L (ref 1–41)
ANION GAP SERPL CALCULATED.3IONS-SCNC: 8 MMOL/L (ref 5–15)
AST SERPL-CCNC: 29 U/L (ref 1–40)
BASOPHILS # BLD AUTO: 0.01 10*3/MM3 (ref 0–0.2)
BASOPHILS NFR BLD AUTO: 0.2 % (ref 0–1.5)
BILIRUB SERPL-MCNC: 0.3 MG/DL (ref 0–1.2)
BUN SERPL-MCNC: 17 MG/DL (ref 6–20)
BUN/CREAT SERPL: 15.3 (ref 7–25)
CALCIUM SPEC-SCNC: 8.6 MG/DL (ref 8.6–10.5)
CHLORIDE SERPL-SCNC: 105 MMOL/L (ref 98–107)
CK SERPL-CCNC: 500 U/L (ref 20–200)
CO2 SERPL-SCNC: 28 MMOL/L (ref 22–29)
CREAT SERPL-MCNC: 1.11 MG/DL (ref 0.76–1.27)
CRP SERPL-MCNC: 1.63 MG/DL (ref 0–0.5)
DEPRECATED RDW RBC AUTO: 44 FL (ref 37–54)
EOSINOPHIL # BLD AUTO: 0.05 10*3/MM3 (ref 0–0.4)
EOSINOPHIL NFR BLD AUTO: 1.2 % (ref 0.3–6.2)
ERYTHROCYTE [DISTWIDTH] IN BLOOD BY AUTOMATED COUNT: 13.9 % (ref 12.3–15.4)
FERRITIN SERPL-MCNC: 488.9 NG/ML (ref 30–400)
GFR SERPL CREATININE-BSD FRML MDRD: 71 ML/MIN/1.73
GLOBULIN UR ELPH-MCNC: 3.2 GM/DL
GLUCOSE SERPL-MCNC: 106 MG/DL (ref 65–99)
HCT VFR BLD AUTO: 41.7 % (ref 37.5–51)
HGB BLD-MCNC: 13.6 G/DL (ref 13–17.7)
IMM GRANULOCYTES # BLD AUTO: 0.01 10*3/MM3 (ref 0–0.05)
IMM GRANULOCYTES NFR BLD AUTO: 0.2 % (ref 0–0.5)
LYMPHOCYTES # BLD AUTO: 0.93 10*3/MM3 (ref 0.7–3.1)
LYMPHOCYTES NFR BLD AUTO: 22.6 % (ref 19.6–45.3)
MCH RBC QN AUTO: 28.3 PG (ref 26.6–33)
MCHC RBC AUTO-ENTMCNC: 32.6 G/DL (ref 31.5–35.7)
MCV RBC AUTO: 86.9 FL (ref 79–97)
MONOCYTES # BLD AUTO: 0.27 10*3/MM3 (ref 0.1–0.9)
MONOCYTES NFR BLD AUTO: 6.6 % (ref 5–12)
NEUTROPHILS NFR BLD AUTO: 2.85 10*3/MM3 (ref 1.7–7)
NEUTROPHILS NFR BLD AUTO: 69.2 % (ref 42.7–76)
NRBC BLD AUTO-RTO: 0 /100 WBC (ref 0–0.2)
PLATELET # BLD AUTO: 148 10*3/MM3 (ref 140–450)
PMV BLD AUTO: 11 FL (ref 6–12)
POTASSIUM SERPL-SCNC: 4.1 MMOL/L (ref 3.5–5.2)
PROT SERPL-MCNC: 6.5 G/DL (ref 6–8.5)
RBC # BLD AUTO: 4.8 10*6/MM3 (ref 4.14–5.8)
SODIUM SERPL-SCNC: 141 MMOL/L (ref 136–145)
WBC # BLD AUTO: 4.12 10*3/MM3 (ref 3.4–10.8)

## 2021-02-13 PROCEDURE — 94799 UNLISTED PULMONARY SVC/PX: CPT

## 2021-02-13 PROCEDURE — 82550 ASSAY OF CK (CPK): CPT | Performed by: NURSE PRACTITIONER

## 2021-02-13 PROCEDURE — 82728 ASSAY OF FERRITIN: CPT | Performed by: NURSE PRACTITIONER

## 2021-02-13 PROCEDURE — 85025 COMPLETE CBC W/AUTO DIFF WBC: CPT | Performed by: NURSE PRACTITIONER

## 2021-02-13 PROCEDURE — 63710000001 DEXAMETHASONE PER 0.25 MG: Performed by: NURSE PRACTITIONER

## 2021-02-13 PROCEDURE — 80053 COMPREHEN METABOLIC PANEL: CPT | Performed by: NURSE PRACTITIONER

## 2021-02-13 PROCEDURE — 25010000002 ENOXAPARIN PER 10 MG: Performed by: INTERNAL MEDICINE

## 2021-02-13 PROCEDURE — 94640 AIRWAY INHALATION TREATMENT: CPT

## 2021-02-13 PROCEDURE — 86140 C-REACTIVE PROTEIN: CPT | Performed by: NURSE PRACTITIONER

## 2021-02-13 PROCEDURE — 99232 SBSQ HOSP IP/OBS MODERATE 35: CPT | Performed by: FAMILY MEDICINE

## 2021-02-13 RX ORDER — ALBUTEROL SULFATE 90 UG/1
2 AEROSOL, METERED RESPIRATORY (INHALATION)
Status: DISCONTINUED | OUTPATIENT
Start: 2021-02-13 | End: 2021-02-15 | Stop reason: HOSPADM

## 2021-02-13 RX ADMIN — BENZONATATE 100 MG: 100 CAPSULE ORAL at 20:21

## 2021-02-13 RX ADMIN — CETIRIZINE HYDROCHLORIDE 10 MG: 10 TABLET, FILM COATED ORAL at 08:31

## 2021-02-13 RX ADMIN — DEXAMETHASONE 6 MG: 4 TABLET ORAL at 08:31

## 2021-02-13 RX ADMIN — LISINOPRIL 40 MG: 10 TABLET ORAL at 08:31

## 2021-02-13 RX ADMIN — PANTOPRAZOLE SODIUM 40 MG: 40 TABLET, DELAYED RELEASE ORAL at 08:31

## 2021-02-13 RX ADMIN — BENZONATATE 100 MG: 100 CAPSULE ORAL at 08:38

## 2021-02-13 RX ADMIN — ENOXAPARIN SODIUM 40 MG: 40 INJECTION SUBCUTANEOUS at 20:21

## 2021-02-13 RX ADMIN — REMDESIVIR 100 MG: 100 INJECTION, POWDER, LYOPHILIZED, FOR SOLUTION INTRAVENOUS at 13:38

## 2021-02-13 RX ADMIN — ENOXAPARIN SODIUM 40 MG: 40 INJECTION SUBCUTANEOUS at 08:31

## 2021-02-13 RX ADMIN — ALBUTEROL SULFATE 2 PUFF: 90 AEROSOL, METERED RESPIRATORY (INHALATION) at 16:00

## 2021-02-13 RX ADMIN — ALBUTEROL SULFATE 2 PUFF: 90 AEROSOL, METERED RESPIRATORY (INHALATION) at 19:54

## 2021-02-13 NOTE — PLAN OF CARE
Goal Outcome Evaluation:        Outcome Summary: VSS. Pt remains on 3L NC. SR on tele. PRN cough meds given x1. Pt has rested well with no complaints.

## 2021-02-13 NOTE — PROGRESS NOTES
[facility] Medicine Services  PROGRESS NOTE    Patient Name: Catracho Sahu  : 1970  MRN: [id number]    Date of Admission: 2021  Primary Care Physician: Vandana Tan MD    Subjective   Subjective     CC:  Follow-up COVID-19 pneumonia    HPI:  Feels better than prior to admission, remains on 3L.  Starting to have bronchospasm type coughing fits.    ROS:  Gen- No fevers, chills  CV- No chest pain, palpitations  Resp-see above   GI- No N/V/D, abd pain    Objective   Objective     Vital Signs:   Temp:  [97.7 °F (36.5 °C)-97.9 °F (36.6 °C)] 97.9 °F (36.6 °C)  Heart Rate:  [58-76] 76  Resp:  [18-19] 18  BP: (142-149)/(82-91) 142/84        Physical Exam:  With patient's consent, physical exam was conducted via visual telemedicine encounter due to patient's current isolation requirements in the interest of PPE conservation.    Constitutional: No acute distress, awake, alert, nontoxic, obese body habitus  HENT: NCAT, MMM, no conjunctival injection  Respiratory: Good effort, nonlabored respirations   Cardiovascular:  tele with NSR  Musculoskeletal: No edema, normal muscle tone and mass for age  Psychiatric: Appropriate affect, good insight and judgement, cooperative      Results Reviewed:  Results from last 7 days   Lab Units 21  0536 21  0609 21  1745   WBC 10*3/mm3 4.12 2.98* 3.61   HEMOGLOBIN g/dL 13.6 14.5 15.4   HEMATOCRIT % 41.7 43.5 46.5   PLATELETS 10*3/mm3 148 140 139*   PROCALCITONIN ng/mL  --   --  0.13     Results from last 7 days   Lab Units 21  0536 21  2228 21  1745   SODIUM mmol/L 141 138 138   POTASSIUM mmol/L 4.1 3.7 3.8   CHLORIDE mmol/L 105 104 103   CO2 mmol/L 28.0 24.0 26.0   BUN mg/dL 17 14 13   CREATININE mg/dL 1.11 1.16 1.20   GLUCOSE mg/dL 106* 136* 101*   CALCIUM mg/dL 8.6 8.4* 8.7   ALT (SGPT) U/L 21 22 22   AST (SGOT) U/L 29 35 34   TROPONIN T ng/mL  --   --  <0.010   PROBNP pg/mL  --  8.7 8.5      Estimated Creatinine Clearance: 147.3 mL/min (by C-G formula based on SCr of 1.11 mg/dL).    Microbiology Results Abnormal     Procedure Component Value - Date/Time    Blood Culture - Blood, Arm, Right [[id number]] Collected: 02/11/21 1810    Lab Status: Preliminary result Specimen: Blood from Arm, Right Updated: 02/12/21 1846     Blood Culture No growth at 24 hours    Blood Culture - Blood, Arm, Left [[id number]] Collected: 02/11/21 1820    Lab Status: Preliminary result Specimen: Blood from Arm, Left Updated: 02/12/21 1846     Blood Culture No growth at 24 hours    S. Pneumo Ag Urine or CSF - Urine, Urine, Clean Catch [[id number]]  (Normal) Collected: 02/12/21 0448    Lab Status: Final result Specimen: Urine, Clean Catch Updated: 02/12/21 1049     Strep Pneumo Ag Negative    Legionella Antigen, Urine - Urine, Urine, Clean Catch [[id number]]  (Normal) Collected: 02/12/21 0448    Lab Status: Final result Specimen: Urine, Clean Catch Updated: 02/12/21 1049     LEGIONELLA ANTIGEN, URINE Negative    COVID-19 and FLU A/B PCR - Swab, Nasopharynx [[id number]]  (Abnormal) Collected: 02/11/21 1824    Lab Status: Final result Specimen: Swab from Nasopharynx Updated: 02/11/21 1951     COVID19 Detected     Influenza A PCR Not Detected     Influenza B PCR Not Detected    Narrative:      Fact sheet for providers: https://www.fda.gov/media/801709/download    Fact sheet for patients: https://www.fda.gov/media/351212/download    Test performed by PCR.  Influenza A and Influenza B negative results should be considered presumptive in samples that have a positive SARS-CoV-2 result.    Competitive inhibition studies showed that SARS-CoV-2 virus, when present at concentrations above 3.6E+04 copies/mL, can inhibit the detection and amplification of influenza A and influenza B virus RNA if present at or below 1.8E+02 copies/mL or 4.9E+02 copies/mL, respectively, and may lead to false negative influenza virus results. If co-infection with  influenza A or influenza B virus is suspected in samples with a positive SARS-CoV-2 result, the sample should be re-tested with another FDA cleared, approved, or authorized influenza test, if influenza virus detection would change clinical management.          Imaging Results (Last 24 Hours)     ** No results found for the last 24 hours. **               I have reviewed the medications:  Scheduled Meds:albuterol sulfate HFA, 2 puff, Inhalation, 4x Daily - RT  cetirizine, 10 mg, Oral, Daily  dexamethasone, 6 mg, Oral, Daily With Breakfast    Or  dexamethasone, 6 mg, Intravenous, Daily With Breakfast  enoxaparin, 40 mg, Subcutaneous, Q12H  lisinopril, 40 mg, Oral, Daily  pantoprazole, 40 mg, Oral, QAM  remdesivir, 100 mg, Intravenous, Daily With Lunch      Continuous Infusions:Pharmacy Consult - Remdesivir,       PRN Meds:.benzonatate  •  dextromethorphan polistirex ER  •  HYDROcod Polst-CPM Polst ER  •  Pharmacy Consult - Remdesivir  •  sodium chloride    Assessment/Plan   Assessment & Plan     Active Hospital Problems    Diagnosis  POA   • **Pneumonia due to COVID-19 virus [U07.1, J12.82]  Yes   • TOREY (obstructive sleep apnea) [G47.33]  Unknown   • Morbid obesity with BMI of 60.0-69.9, adult (CMS/[facility]) [E66.01, Z68.44]  Not Applicable   • Benign essential HTN [I10]  Yes      Resolved Hospital Problems   No resolved problems to display.        Brief Hospital Course to date:  Catracho Sahu is a 50 y.o. male with Hx HTN, TOREY, reportedly testing positive 2/4/2021 who eventually clinically declined and presented to the hospital with fevers and cough, change of taste/smell admitted for management of COVID-19 pneumonia.    This patient's problems and plans were partially entered by my partner and updated as appropriate by me 02/13/21.          COVID-19 pneumonia with hypoxia  -First pos test reported 2/4/2021, x20 days isolation will be 2/24/2021  -Continue dexamethasone (x10 days will be 2/20/2021)  -Continue  remdesivir (x5 days will be 2/15/2021)  -Continue O2 support weaning as tolerated  -Remains on 3 L    Hypertension  -Continue lisinopril    TOREY  -Hx of correctional surgery    Super morbid obesity  -BMI 61.03 kg/M2    DVT Prophylaxis: Lovenox        Disposition: I expect the patient to be discharged eventually 2/15/2021 after completion of remdesivir if respiratory status stable.    CODE STATUS:   Code Status and Medical Interventions:   Ordered at: 02/11/21 2615     Level Of Support Discussed With:    Patient     Code Status:    CPR     Medical Interventions (Level of Support Prior to Arrest):    Full       Archana Olivera MD  02/13/21

## 2021-02-14 LAB
ALBUMIN SERPL-MCNC: 3.3 G/DL (ref 3.5–5.2)
ALBUMIN/GLOB SERPL: 1.1 G/DL
ALP SERPL-CCNC: 56 U/L (ref 39–117)
ALT SERPL W P-5'-P-CCNC: 17 U/L (ref 1–41)
ANION GAP SERPL CALCULATED.3IONS-SCNC: 7 MMOL/L (ref 5–15)
AST SERPL-CCNC: 24 U/L (ref 1–40)
BASOPHILS # BLD AUTO: 0.01 10*3/MM3 (ref 0–0.2)
BASOPHILS NFR BLD AUTO: 0.2 % (ref 0–1.5)
BILIRUB SERPL-MCNC: 0.4 MG/DL (ref 0–1.2)
BUN SERPL-MCNC: 15 MG/DL (ref 6–20)
BUN/CREAT SERPL: 14 (ref 7–25)
CALCIUM SPEC-SCNC: 8.5 MG/DL (ref 8.6–10.5)
CHLORIDE SERPL-SCNC: 105 MMOL/L (ref 98–107)
CK SERPL-CCNC: 241 U/L (ref 20–200)
CO2 SERPL-SCNC: 29 MMOL/L (ref 22–29)
CREAT SERPL-MCNC: 1.07 MG/DL (ref 0.76–1.27)
CRP SERPL-MCNC: 1.47 MG/DL (ref 0–0.5)
D DIMER PPP FEU-MCNC: <0.27 MCGFEU/ML (ref 0–0.56)
DEPRECATED RDW RBC AUTO: 45.1 FL (ref 37–54)
EOSINOPHIL # BLD AUTO: 0.05 10*3/MM3 (ref 0–0.4)
EOSINOPHIL NFR BLD AUTO: 1.1 % (ref 0.3–6.2)
ERYTHROCYTE [DISTWIDTH] IN BLOOD BY AUTOMATED COUNT: 14.1 % (ref 12.3–15.4)
FERRITIN SERPL-MCNC: 454.5 NG/ML (ref 30–400)
FIBRINOGEN PPP-MCNC: 489 MG/DL (ref 220–470)
GFR SERPL CREATININE-BSD FRML MDRD: 74 ML/MIN/1.73
GLOBULIN UR ELPH-MCNC: 3.1 GM/DL
GLUCOSE SERPL-MCNC: 93 MG/DL (ref 65–99)
HCT VFR BLD AUTO: 41.1 % (ref 37.5–51)
HGB BLD-MCNC: 13.1 G/DL (ref 13–17.7)
IMM GRANULOCYTES # BLD AUTO: 0.01 10*3/MM3 (ref 0–0.05)
IMM GRANULOCYTES NFR BLD AUTO: 0.2 % (ref 0–0.5)
LDH SERPL-CCNC: 301 U/L (ref 135–225)
LYMPHOCYTES # BLD AUTO: 1.1 10*3/MM3 (ref 0.7–3.1)
LYMPHOCYTES NFR BLD AUTO: 25.2 % (ref 19.6–45.3)
MCH RBC QN AUTO: 27.9 PG (ref 26.6–33)
MCHC RBC AUTO-ENTMCNC: 31.9 G/DL (ref 31.5–35.7)
MCV RBC AUTO: 87.6 FL (ref 79–97)
MONOCYTES # BLD AUTO: 0.32 10*3/MM3 (ref 0.1–0.9)
MONOCYTES NFR BLD AUTO: 7.3 % (ref 5–12)
NEUTROPHILS NFR BLD AUTO: 2.88 10*3/MM3 (ref 1.7–7)
NEUTROPHILS NFR BLD AUTO: 66 % (ref 42.7–76)
NRBC BLD AUTO-RTO: 0 /100 WBC (ref 0–0.2)
PLATELET # BLD AUTO: 175 10*3/MM3 (ref 140–450)
PMV BLD AUTO: 11.1 FL (ref 6–12)
POTASSIUM SERPL-SCNC: 3.9 MMOL/L (ref 3.5–5.2)
PROT SERPL-MCNC: 6.4 G/DL (ref 6–8.5)
RBC # BLD AUTO: 4.69 10*6/MM3 (ref 4.14–5.8)
SODIUM SERPL-SCNC: 141 MMOL/L (ref 136–145)
WBC # BLD AUTO: 4.37 10*3/MM3 (ref 3.4–10.8)

## 2021-02-14 PROCEDURE — 82550 ASSAY OF CK (CPK): CPT | Performed by: NURSE PRACTITIONER

## 2021-02-14 PROCEDURE — 94799 UNLISTED PULMONARY SVC/PX: CPT

## 2021-02-14 PROCEDURE — 85025 COMPLETE CBC W/AUTO DIFF WBC: CPT | Performed by: NURSE PRACTITIONER

## 2021-02-14 PROCEDURE — 80053 COMPREHEN METABOLIC PANEL: CPT | Performed by: NURSE PRACTITIONER

## 2021-02-14 PROCEDURE — 85379 FIBRIN DEGRADATION QUANT: CPT | Performed by: NURSE PRACTITIONER

## 2021-02-14 PROCEDURE — 82728 ASSAY OF FERRITIN: CPT | Performed by: NURSE PRACTITIONER

## 2021-02-14 PROCEDURE — 83615 LACTATE (LD) (LDH) ENZYME: CPT | Performed by: NURSE PRACTITIONER

## 2021-02-14 PROCEDURE — 85384 FIBRINOGEN ACTIVITY: CPT | Performed by: NURSE PRACTITIONER

## 2021-02-14 PROCEDURE — 63710000001 DEXAMETHASONE PER 0.25 MG: Performed by: NURSE PRACTITIONER

## 2021-02-14 PROCEDURE — 86140 C-REACTIVE PROTEIN: CPT | Performed by: NURSE PRACTITIONER

## 2021-02-14 PROCEDURE — 25010000002 ENOXAPARIN PER 10 MG: Performed by: INTERNAL MEDICINE

## 2021-02-14 PROCEDURE — 99231 SBSQ HOSP IP/OBS SF/LOW 25: CPT | Performed by: FAMILY MEDICINE

## 2021-02-14 RX ADMIN — LISINOPRIL 40 MG: 10 TABLET ORAL at 08:09

## 2021-02-14 RX ADMIN — ENOXAPARIN SODIUM 40 MG: 40 INJECTION SUBCUTANEOUS at 08:10

## 2021-02-14 RX ADMIN — ALBUTEROL SULFATE 2 PUFF: 90 AEROSOL, METERED RESPIRATORY (INHALATION) at 19:40

## 2021-02-14 RX ADMIN — ENOXAPARIN SODIUM 40 MG: 40 INJECTION SUBCUTANEOUS at 20:02

## 2021-02-14 RX ADMIN — ALBUTEROL SULFATE 2 PUFF: 90 AEROSOL, METERED RESPIRATORY (INHALATION) at 16:33

## 2021-02-14 RX ADMIN — DEXAMETHASONE 6 MG: 4 TABLET ORAL at 08:09

## 2021-02-14 RX ADMIN — PANTOPRAZOLE SODIUM 40 MG: 40 TABLET, DELAYED RELEASE ORAL at 08:10

## 2021-02-14 RX ADMIN — HYDROCODONE POLISTIREX AND CHLORPHENIRAMINE POLISTIREX 5 ML: 10; 8 SUSPENSION, EXTENDED RELEASE ORAL at 20:03

## 2021-02-14 RX ADMIN — CETIRIZINE HYDROCHLORIDE 10 MG: 10 TABLET, FILM COATED ORAL at 08:10

## 2021-02-14 RX ADMIN — SODIUM CHLORIDE, PRESERVATIVE FREE 10 ML: 5 INJECTION INTRAVENOUS at 08:09

## 2021-02-14 RX ADMIN — ALBUTEROL SULFATE 2 PUFF: 90 AEROSOL, METERED RESPIRATORY (INHALATION) at 13:08

## 2021-02-14 RX ADMIN — SODIUM CHLORIDE, PRESERVATIVE FREE 10 ML: 5 INJECTION INTRAVENOUS at 20:03

## 2021-02-14 RX ADMIN — REMDESIVIR 100 MG: 100 INJECTION, POWDER, LYOPHILIZED, FOR SOLUTION INTRAVENOUS at 11:58

## 2021-02-14 NOTE — PLAN OF CARE
Goal Outcome Evaluation:        Outcome Summary: VSS. Pt remains on 3L NC. SR on tele. PRN cough meds given x1. Pt has rested well with no complaints

## 2021-02-14 NOTE — PROGRESS NOTES
[facility] Medicine Services  PROGRESS NOTE    Patient Name: Catracho Sahu  : 1970  MRN: [id number]    Date of Admission: 2021  Primary Care Physician: Vandana Tan MD    Subjective   Subjective     CC:  Follow-up COVID-19 pneumonia    HPI:  Improved bronchospasm cough.  Weaned from 3 L to 2 L today.    ROS:  Gen- No fevers, chills  CV- No chest pain, palpitations  Resp-see above   GI- No N/V/D, abd pain    Objective   Objective     Vital Signs:   Temp:  [97.7 °F (36.5 °C)-99.3 °F (37.4 °C)] 98.1 °F (36.7 °C)  Heart Rate:  [58-79] 70  Resp:  [18-20] 20  BP: (125-154)/(78-93) 139/78        Physical Exam:  With patient's consent, physical exam was conducted via visual telemedicine encounter due to patient's current isolation requirements in the interest of PPE conservation.    Constitutional: No acute distress, awake, alert, nontoxic, obese body habitus  HENT: NCAT, MMM, no conjunctival injection  Respiratory: Good effort, nonlabored respirations   Cardiovascular:  tele with NSR  Musculoskeletal: No edema, normal muscle tone and mass for age  Psychiatric: Appropriate affect, good insight and judgement, cooperative  Unchanged    Results Reviewed:  Results from last 7 days   Lab Units 21  0521  0536 21  0609 21  1745   WBC 10*3/mm3 4.37 4.12 2.98* 3.61   HEMOGLOBIN g/dL 13.1 13.6 14.5 15.4   HEMATOCRIT % 41.1 41.7 43.5 46.5   PLATELETS 10*3/mm3 175 148 140 139*   PROCALCITONIN ng/mL  --   --   --  0.13     Results from last 7 days   Lab Units 21  0520 21  0536 21  2228 21  1745   SODIUM mmol/L 141 141 138 138   POTASSIUM mmol/L 3.9 4.1 3.7 3.8   CHLORIDE mmol/L 105 105 104 103   CO2 mmol/L 29.0 28.0 24.0 26.0   BUN mg/dL 15 17 14 13   CREATININE mg/dL 1.07 1.11 1.16 1.20   GLUCOSE mg/dL 93 106* 136* 101*   CALCIUM mg/dL 8.5* 8.6 8.4* 8.7   ALT (SGPT) U/L 17 21 22 22   AST (SGOT) U/L 24 29 35 34   TROPONIN T  ng/mL  --   --   --  <0.010   PROBNP pg/mL  --   --  8.7 8.5     Estimated Creatinine Clearance: 152.9 mL/min (by C-G formula based on SCr of 1.07 mg/dL).    Microbiology Results Abnormal     Procedure Component Value - Date/Time    Blood Culture - Blood, Arm, Right [[id number]] Collected: 02/11/21 1810    Lab Status: Preliminary result Specimen: Blood from Arm, Right Updated: 02/13/21 1846     Blood Culture No growth at 2 days    Blood Culture - Blood, Arm, Left [[id number]] Collected: 02/11/21 1820    Lab Status: Preliminary result Specimen: Blood from Arm, Left Updated: 02/13/21 1846     Blood Culture No growth at 2 days    S. Pneumo Ag Urine or CSF - Urine, Urine, Clean Catch [[id number]]  (Normal) Collected: 02/12/21 0448    Lab Status: Final result Specimen: Urine, Clean Catch Updated: 02/12/21 1049     Strep Pneumo Ag Negative    Legionella Antigen, Urine - Urine, Urine, Clean Catch [[id number]]  (Normal) Collected: 02/12/21 0448    Lab Status: Final result Specimen: Urine, Clean Catch Updated: 02/12/21 1049     LEGIONELLA ANTIGEN, URINE Negative    COVID-19 and FLU A/B PCR - Swab, Nasopharynx [[id number]]  (Abnormal) Collected: 02/11/21 1824    Lab Status: Final result Specimen: Swab from Nasopharynx Updated: 02/11/21 1951     COVID19 Detected     Influenza A PCR Not Detected     Influenza B PCR Not Detected    Narrative:      Fact sheet for providers: https://www.fda.gov/media/927189/download    Fact sheet for patients: https://www.fda.gov/media/839434/download    Test performed by PCR.  Influenza A and Influenza B negative results should be considered presumptive in samples that have a positive SARS-CoV-2 result.    Competitive inhibition studies showed that SARS-CoV-2 virus, when present at concentrations above 3.6E+04 copies/mL, can inhibit the detection and amplification of influenza A and influenza B virus RNA if present at or below 1.8E+02 copies/mL or 4.9E+02 copies/mL, respectively, and may lead to false  negative influenza virus results. If co-infection with influenza A or influenza B virus is suspected in samples with a positive SARS-CoV-2 result, the sample should be re-tested with another FDA cleared, approved, or authorized influenza test, if influenza virus detection would change clinical management.          Imaging Results (Last 24 Hours)     ** No results found for the last 24 hours. **               I have reviewed the medications:  Scheduled Meds:albuterol sulfate HFA, 2 puff, Inhalation, 4x Daily - RT  cetirizine, 10 mg, Oral, Daily  dexamethasone, 6 mg, Oral, Daily With Breakfast    Or  dexamethasone, 6 mg, Intravenous, Daily With Breakfast  enoxaparin, 40 mg, Subcutaneous, Q12H  lisinopril, 40 mg, Oral, Daily  pantoprazole, 40 mg, Oral, QAM  remdesivir, 100 mg, Intravenous, Daily With Lunch      Continuous Infusions:Pharmacy Consult - Remdesivir,       PRN Meds:.benzonatate  •  dextromethorphan polistirex ER  •  HYDROcod Polst-CPM Polst ER  •  Pharmacy Consult - Remdesivir  •  sodium chloride    Assessment/Plan   Assessment & Plan     Active Hospital Problems    Diagnosis  POA   • **Pneumonia due to COVID-19 virus [U07.1, J12.82]  Yes   • TOREY (obstructive sleep apnea) [G47.33]  Unknown   • Morbid obesity with BMI of 60.0-69.9, adult (CMS/[facility]) [E66.01, Z68.44]  Not Applicable   • Benign essential HTN [I10]  Yes      Resolved Hospital Problems   No resolved problems to display.        Brief Hospital Course to date:  Catracho Sahu is a 50 y.o. male with Hx HTN, TOREY, reportedly testing positive 2/4/2021 who eventually clinically declined and presented to the hospital with fevers and cough, change of taste/smell admitted for management of COVID-19 pneumonia.    This patient's problems and plans were partially entered by my partner and updated as appropriate by me 02/14/21.          COVID-19 pneumonia with hypoxia  -First pos test reported 2/4/2021, x20 days isolation will be 2/24/2021  -Continue  dexamethasone (x10 days will be 2/20/2021)  -Continue remdesivir (x5 days will be 2/15/2021)  -Continue O2 support weaning as tolerated  -Currently on 2 L  - bronchospasm improved with albuterol INH and Tussionex    Hypertension  -Continue lisinopril    TOREY  -Hx of correctional surgery    Super morbid obesity  -BMI 61.03 kg/M2    DVT Prophylaxis: Lovenox        Disposition: I expect the patient to be discharged toorrow 2/15/2021 after completion of remdesivir if respiratory status stable.    CODE STATUS:   Code Status and Medical Interventions:   Ordered at: 02/11/21 2138     Level Of Support Discussed With:    Patient     Code Status:    CPR     Medical Interventions (Level of Support Prior to Arrest):    Full       Archana Olivera MD  02/14/21

## 2021-02-15 ENCOUNTER — READMISSION MANAGEMENT (OUTPATIENT)
Dept: CALL CENTER | Facility: HOSPITAL | Age: 51
End: 2021-02-15

## 2021-02-15 VITALS
WEIGHT: 315 LBS | HEART RATE: 76 BPM | OXYGEN SATURATION: 97 % | HEIGHT: 72 IN | TEMPERATURE: 98.7 F | RESPIRATION RATE: 17 BRPM | DIASTOLIC BLOOD PRESSURE: 92 MMHG | BODY MASS INDEX: 42.66 KG/M2 | SYSTOLIC BLOOD PRESSURE: 150 MMHG

## 2021-02-15 PROBLEM — U07.1 PNEUMONIA DUE TO COVID-19 VIRUS: Status: RESOLVED | Noted: 2021-02-11 | Resolved: 2021-02-15

## 2021-02-15 PROBLEM — J12.82 PNEUMONIA DUE TO COVID-19 VIRUS: Status: RESOLVED | Noted: 2021-02-11 | Resolved: 2021-02-15

## 2021-02-15 LAB
ALBUMIN SERPL-MCNC: 3.4 G/DL (ref 3.5–5.2)
ALBUMIN/GLOB SERPL: 1.1 G/DL
ALP SERPL-CCNC: 58 U/L (ref 39–117)
ALT SERPL W P-5'-P-CCNC: 19 U/L (ref 1–41)
ANION GAP SERPL CALCULATED.3IONS-SCNC: 7 MMOL/L (ref 5–15)
AST SERPL-CCNC: 25 U/L (ref 1–40)
BASOPHILS # BLD AUTO: 0.02 10*3/MM3 (ref 0–0.2)
BASOPHILS NFR BLD AUTO: 0.4 % (ref 0–1.5)
BILIRUB SERPL-MCNC: 0.3 MG/DL (ref 0–1.2)
BUN SERPL-MCNC: 17 MG/DL (ref 6–20)
BUN/CREAT SERPL: 15.2 (ref 7–25)
CALCIUM SPEC-SCNC: 8.5 MG/DL (ref 8.6–10.5)
CHLORIDE SERPL-SCNC: 106 MMOL/L (ref 98–107)
CK SERPL-CCNC: 99 U/L (ref 20–200)
CO2 SERPL-SCNC: 28 MMOL/L (ref 22–29)
CREAT SERPL-MCNC: 1.12 MG/DL (ref 0.76–1.27)
CRP SERPL-MCNC: 1.21 MG/DL (ref 0–0.5)
DEPRECATED RDW RBC AUTO: 45.4 FL (ref 37–54)
EOSINOPHIL # BLD AUTO: 0.09 10*3/MM3 (ref 0–0.4)
EOSINOPHIL NFR BLD AUTO: 2 % (ref 0.3–6.2)
ERYTHROCYTE [DISTWIDTH] IN BLOOD BY AUTOMATED COUNT: 14 % (ref 12.3–15.4)
FERRITIN SERPL-MCNC: 452.6 NG/ML (ref 30–400)
GFR SERPL CREATININE-BSD FRML MDRD: 70 ML/MIN/1.73
GLOBULIN UR ELPH-MCNC: 3.1 GM/DL
GLUCOSE SERPL-MCNC: 89 MG/DL (ref 65–99)
HCT VFR BLD AUTO: 42.3 % (ref 37.5–51)
HGB BLD-MCNC: 13.4 G/DL (ref 13–17.7)
IMM GRANULOCYTES # BLD AUTO: 0.02 10*3/MM3 (ref 0–0.05)
IMM GRANULOCYTES NFR BLD AUTO: 0.4 % (ref 0–0.5)
LYMPHOCYTES # BLD AUTO: 1.34 10*3/MM3 (ref 0.7–3.1)
LYMPHOCYTES NFR BLD AUTO: 29.5 % (ref 19.6–45.3)
MCH RBC QN AUTO: 27.9 PG (ref 26.6–33)
MCHC RBC AUTO-ENTMCNC: 31.7 G/DL (ref 31.5–35.7)
MCV RBC AUTO: 88.1 FL (ref 79–97)
MONOCYTES # BLD AUTO: 0.42 10*3/MM3 (ref 0.1–0.9)
MONOCYTES NFR BLD AUTO: 9.3 % (ref 5–12)
NEUTROPHILS NFR BLD AUTO: 2.65 10*3/MM3 (ref 1.7–7)
NEUTROPHILS NFR BLD AUTO: 58.4 % (ref 42.7–76)
NRBC BLD AUTO-RTO: 0 /100 WBC (ref 0–0.2)
PLATELET # BLD AUTO: 208 10*3/MM3 (ref 140–450)
PMV BLD AUTO: 10.8 FL (ref 6–12)
POTASSIUM SERPL-SCNC: 4 MMOL/L (ref 3.5–5.2)
PROT SERPL-MCNC: 6.5 G/DL (ref 6–8.5)
RBC # BLD AUTO: 4.8 10*6/MM3 (ref 4.14–5.8)
SODIUM SERPL-SCNC: 141 MMOL/L (ref 136–145)
WBC # BLD AUTO: 4.54 10*3/MM3 (ref 3.4–10.8)

## 2021-02-15 PROCEDURE — 82728 ASSAY OF FERRITIN: CPT | Performed by: NURSE PRACTITIONER

## 2021-02-15 PROCEDURE — 94799 UNLISTED PULMONARY SVC/PX: CPT

## 2021-02-15 PROCEDURE — 80053 COMPREHEN METABOLIC PANEL: CPT | Performed by: NURSE PRACTITIONER

## 2021-02-15 PROCEDURE — 86140 C-REACTIVE PROTEIN: CPT | Performed by: NURSE PRACTITIONER

## 2021-02-15 PROCEDURE — 25010000002 ENOXAPARIN PER 10 MG: Performed by: INTERNAL MEDICINE

## 2021-02-15 PROCEDURE — 99239 HOSP IP/OBS DSCHRG MGMT >30: CPT | Performed by: FAMILY MEDICINE

## 2021-02-15 PROCEDURE — 63710000001 DEXAMETHASONE PER 0.25 MG: Performed by: NURSE PRACTITIONER

## 2021-02-15 PROCEDURE — 85025 COMPLETE CBC W/AUTO DIFF WBC: CPT | Performed by: NURSE PRACTITIONER

## 2021-02-15 PROCEDURE — 82550 ASSAY OF CK (CPK): CPT | Performed by: NURSE PRACTITIONER

## 2021-02-15 RX ORDER — BENZONATATE 100 MG/1
100 CAPSULE ORAL 3 TIMES DAILY PRN
Qty: 20 CAPSULE | Refills: 0 | Status: SHIPPED | OUTPATIENT
Start: 2021-02-15 | End: 2021-03-10

## 2021-02-15 RX ORDER — DEXTROMETHORPHAN POLISTIREX 30 MG/5ML
60 SUSPENSION ORAL EVERY 12 HOURS PRN
Qty: 280 ML | Refills: 0 | Status: SHIPPED | OUTPATIENT
Start: 2021-02-15 | End: 2021-03-10

## 2021-02-15 RX ORDER — DEXAMETHASONE 6 MG/1
6 TABLET ORAL
Qty: 6 TABLET | Refills: 0 | Status: SHIPPED | OUTPATIENT
Start: 2021-02-16 | End: 2021-02-22

## 2021-02-15 RX ADMIN — ENOXAPARIN SODIUM 40 MG: 40 INJECTION SUBCUTANEOUS at 08:50

## 2021-02-15 RX ADMIN — REMDESIVIR 100 MG: 100 INJECTION, POWDER, LYOPHILIZED, FOR SOLUTION INTRAVENOUS at 11:25

## 2021-02-15 RX ADMIN — SODIUM CHLORIDE, PRESERVATIVE FREE 10 ML: 5 INJECTION INTRAVENOUS at 08:50

## 2021-02-15 RX ADMIN — LISINOPRIL 40 MG: 10 TABLET ORAL at 08:49

## 2021-02-15 RX ADMIN — PANTOPRAZOLE SODIUM 40 MG: 40 TABLET, DELAYED RELEASE ORAL at 08:49

## 2021-02-15 RX ADMIN — CETIRIZINE HYDROCHLORIDE 10 MG: 10 TABLET, FILM COATED ORAL at 08:49

## 2021-02-15 RX ADMIN — DEXAMETHASONE 6 MG: 4 TABLET ORAL at 08:49

## 2021-02-15 RX ADMIN — ALBUTEROL SULFATE 2 PUFF: 90 AEROSOL, METERED RESPIRATORY (INHALATION) at 07:49

## 2021-02-15 NOTE — PROGRESS NOTES
Continued Stay Note   [place]     Patient Name: Catracho Sahu  MRN: [id number]  Today's Date: 2/15/2021    Admit Date: 2/11/2021    Discharge Plan     Row Name 02/15/21 1241       Plan    Plan  Home at discharge    Patient/Family in Agreement with Plan  yes    Plan Comments  Patient has discharge orders for today. He is on room air and will get his last dose of Remdesivir. He remains independent of ADLs - a pulse ox has been given to the nurse for bedside delivery so patient can monitor 02 sats at home. Patient arranging private transportation - no other needs identified- celsa [phone]    Final Discharge Disposition Code  01 - home or self-care        Discharge Codes    No documentation.       Expected Discharge Date and Time     Expected Discharge Date Expected Discharge Time    Feb 15, 2021             Celsa Holman RN

## 2021-02-15 NOTE — OUTREACH NOTE
Prep Survey      Responses   Congregational facility patient discharged from?  [place]   Is LACE score < 7 ?  No   Emergency Room discharge w/ pulse ox?  No   Eligibility  [facility]   Date of Admission  02/11/21   Date of Discharge  02/15/21   Discharge Disposition  Home or Self Care   Discharge diagnosis  PNA due to Covid 19   Does the patient have one of the following disease processes/diagnoses(primary or secondary)?  COVID-19   Does the patient have Home health ordered?  No   Is there a DME ordered?  No   Prep survey completed?  Yes          Paty Fallon RN

## 2021-02-15 NOTE — PLAN OF CARE
Goal Outcome Evaluation:        Outcome Summary: VSS. Pt remains on 1L NC for comfort per pt. SR on tele. PRN cough meds given x1. Pt has rested well with no complaints

## 2021-02-15 NOTE — DISCHARGE SUMMARY
[facility] Medicine Services  DISCHARGE SUMMARY    Patient Name: Catracho Sahu  : 1970  MRN: [id number]    Date of Admission: 2021  5:29 PM  Date of Discharge:  02/15/21    Primary Care Physician: Vandana Tan MD      Hospital Course     Presenting Problem:   Pneumonia due to COVID-19 virus [U07.1, J12.82]  Pneumonia due to COVID-19 virus [U07.1, J12.82]    Active Hospital Problems    Diagnosis  POA   • TOREY (obstructive sleep apnea) [G47.33]  Yes   • Morbid obesity with BMI of 60.0-69.9, adult (CMS/[facility]) [E66.01, Z68.44]  Not Applicable   • Benign essential HTN [I10]  Yes      Resolved Hospital Problems    Diagnosis Date Resolved POA   • **Pneumonia due to COVID-19 virus [U07.1, J12.82] 02/15/2021 Yes          Hospital Course:  Catracho Sahu is a 50 y.o. male with Hx HTN, TOREY, reportedly testing positive 2021 who eventually clinically declined and presented to the hospital with fevers and cough, change of taste/smell admitted for management of COVID-19 pneumonia.         COVID-19 pneumonia with hypoxia  -First pos test reported 2021, x20 days isolation will be 2021  -Continue dexamethasone (x10 days will end on 2021)  -s/p remdesivir x5 days   -Continue O2 support weaning as tolerated  -Currently on 0.5L  - bronchospasm improved with albuterol INH and scheduled cough meds     Hypertension  -Continue lisinopril     TOREY  -Hx of correctional surgery     Super morbid obesity  -BMI 61.03 kg/M2    COVID Symptom Date of Onset:  ___21___  Date of first positive COVID test: __21__  Quarantine Complete 20 days after date of onset:__21_____      Day of Discharge     HPI:   Feels well, O2 weaned to 0.5L and may be on RA later today at d/c.  If not CM will set up temporary home O2.     Vital Signs:   Temp:  [98.1 °F (36.7 °C)-98.7 °F (37.1 °C)] 98.7 °F (37.1 °C)  Heart Rate:  [56-82] 76  Resp:  [17-20] 17  BP: (118-159)/(70-92)  150/92     Physical Exam:  With patient's consent, physical exam was conducted via visual telemedicine encounter due to patient's current isolation requirements in the interest of PPE conservation.    Constitutional: No acute distress, awake, alert, nontoxic, normal body habitus  HENT: NCAT, MMM, no conjunctival injection  Respiratory: Good effort, nonlabored respirations   Cardiovascular:  tele with NSR  Musculoskeletal: No edema, normal muscle tone and mass for age  Psychiatric: Appropriate affect, good insight and judgement, cooperative  Neurologic: Oriented x 3, movements symmetric BUE and BLE, speech clear and fluent  Skin: No visible rashes, no jaundice seen on exposed skin through window        Pertinent  and/or Most Recent Results     LAB RESULTS:      Lab 02/15/21  0747 02/14/21  0520 02/13/21  0536 02/12/21 0609 02/11/21 2228 02/11/21  1745   WBC 4.54 4.37 4.12 2.98*  --  3.61   HEMOGLOBIN 13.4 13.1 13.6 14.5  --  15.4   HEMATOCRIT 42.3 41.1 41.7 43.5  --  46.5   PLATELETS 208 175 148 140  --  139*   NEUTROS ABS 2.65 2.88 2.85 2.08  --  2.06   IMMATURE GRANS (ABS) 0.02 0.01 0.01 0.00  --  0.01   LYMPHS ABS 1.34 1.10 0.93 0.69*  --  1.20   MONOS ABS 0.42 0.32 0.27 0.19  --  0.28   EOS ABS 0.09 0.05 0.05 0.01  --  0.05   MCV 88.1 87.6 86.9 85.5  --  83.9   SED RATE  --   --   --   --   --  44*   CRP  --  1.47* 1.63*  --  2.62* 2.84*   PROCALCITONIN  --   --   --   --   --  0.13   LACTATE  --   --   --   --   --  0.8   LDH  --  301*  --  210 231* 244*   D DIMER QUANT  --  <0.27  --  0.51 0.48 0.47         Lab 02/14/21  0520 02/13/21  0536 02/11/21 2228 02/11/21  1745   SODIUM 141 141 138 138   POTASSIUM 3.9 4.1 3.7 3.8   CHLORIDE 105 105 104 103   CO2 29.0 28.0 24.0 26.0   ANION GAP 7.0 8.0 10.0 9.0   BUN 15 17 14 13   CREATININE 1.07 1.11 1.16 1.20   GLUCOSE 93 106* 136* 101*   CALCIUM 8.5* 8.6 8.4* 8.7         Lab 02/14/21  0520 02/13/21  0536 02/11/21  2228 02/11/21  1745   TOTAL PROTEIN 6.4 6.5 7.0 7.4    ALBUMIN 3.30* 3.30* 3.50 3.80   GLOBULIN 3.1 3.2 3.5 3.6   ALT (SGPT) 17 21 22 22   AST (SGOT) 24 29 35 34   BILIRUBIN 0.4 0.3 0.5 0.7   ALK PHOS 56 58 61 66         Lab 02/11/21  2228 02/11/21  1745   PROBNP 8.7 8.5   TROPONIN T  --  <0.010             Lab 02/14/21  0520   FERRITIN 454.50*         Brief Urine Lab Results  (Last result in the past 365 days)      Color   Clarity   Blood   Leuk Est   Nitrite   Protein   CREAT   Urine HCG        02/11/21 1950 Dark Yellow Clear Trace Negative Negative 100 mg/dL (2+)             Microbiology Results (last 10 days)     Procedure Component Value - Date/Time    Legionella Antigen, Urine - Urine, Urine, Clean Catch [[id number]]  (Normal) Collected: 02/12/21 0448    Lab Status: Final result Specimen: Urine, Clean Catch Updated: 02/12/21 1049     LEGIONELLA ANTIGEN, URINE Negative    S. Pneumo Ag Urine or CSF - Urine, Urine, Clean Catch [[id number]]  (Normal) Collected: 02/12/21 0448    Lab Status: Final result Specimen: Urine, Clean Catch Updated: 02/12/21 1049     Strep Pneumo Ag Negative    COVID-19 and FLU A/B PCR - Swab, Nasopharynx [[id number]]  (Abnormal) Collected: 02/11/21 1824    Lab Status: Final result Specimen: Swab from Nasopharynx Updated: 02/11/21 1951     COVID19 Detected     Influenza A PCR Not Detected     Influenza B PCR Not Detected    Narrative:      Fact sheet for providers: https://www.fda.gov/media/425178/download    Fact sheet for patients: https://www.fda.gov/media/924595/download    Test performed by PCR.  Influenza A and Influenza B negative results should be considered presumptive in samples that have a positive SARS-CoV-2 result.    Competitive inhibition studies showed that SARS-CoV-2 virus, when present at concentrations above 3.6E+04 copies/mL, can inhibit the detection and amplification of influenza A and influenza B virus RNA if present at or below 1.8E+02 copies/mL or 4.9E+02 copies/mL, respectively, and may lead to false negative influenza  virus results. If co-infection with influenza A or influenza B virus is suspected in samples with a positive SARS-CoV-2 result, the sample should be re-tested with another FDA cleared, approved, or authorized influenza test, if influenza virus detection would change clinical management.    Blood Culture - Blood, Arm, Left [[id number]] Collected: 02/11/21 1820    Lab Status: Preliminary result Specimen: Blood from Arm, Left Updated: 02/14/21 1846     Blood Culture No growth at 3 days    Blood Culture - Blood, Arm, Right [[id number]] Collected: 02/11/21 1810    Lab Status: Preliminary result Specimen: Blood from Arm, Right Updated: 02/14/21 1846     Blood Culture No growth at 3 days          Ct Angiogram Chest    Result Date: 2/11/2021  CTA Chest INDICATION: Hypoxia. Pneumonia. Increased clinical risk for pulmonary emboli. TECHNIQUE: CT angiogram of the chest with 100 cc of Isovue-300 IV contrast. 3-D reconstructions were obtained and reviewed.   Radiation dose reduction techniques included automated exposure control or exposure modulation based on body size. Count of known CT and cardiac nuc med studies performed in previous 12 months: 0. COMPARISON: None available. FINDINGS: Chest images at mediastinal window show good filling of the pulmonary arteries. There are no pulmonary artery filling defects to suggest emboli. No effusions or adenopathy are noted. No acute findings in the aorta. The CT angiographic images also show no evidence of emboli. Chest images at lung window demonstrate multiple patchy ground glass infiltrates bilaterally, are prominent in the lower lung fields than in the upper. Commonly reported imaging features of COVID-19 pneumonia are present. Other processes such as influenza pneumonia and organizing pneumonia can cause a similar imaging pattern.     Moderately extensive bilateral pneumonia. No evidence of pulmonary embolism. Signer Name: Elia Bowie MD  Signed: 2/11/2021 8:08 PM  Workstation  Name: [email or web address]  [facility]                     Plan for Follow-up of Pending Labs/Results: Dr. Olivera  Pending Labs     Order Current Status    C-reactive Protein In process    CK In process    Comprehensive Metabolic Panel In process    Ferritin In process    Interleukin-6 In process    Blood Culture - Blood, Arm, Left Preliminary result    Blood Culture - Blood, Arm, Right Preliminary result        Discharge Details        Discharge Medications      New Medications      Instructions Start Date   benzonatate 100 MG capsule  Commonly known as: TESSALON   100 mg, Oral, 3 Times Daily PRN      dexamethasone 6 MG tablet  Commonly known as: DECADRON   6 mg, Oral, Daily With Breakfast   Start Date: February 16, 2021     dextromethorphan polistirex ER Suspension Extended Release oral suspension  Commonly known as: DELSYM   60 mg, Oral, Every 12 Hours PRN         Continue These Medications      Instructions Start Date   guaiFENesin-codeine 100-10 MG/5ML liquid  Commonly known as: GUAIFENESIN AC   5 mL, Oral, 3 Times Daily PRN      lisinopril 40 MG tablet  Commonly known as: PRINIVIL,ZESTRIL   40 mg, Oral, Daily      omeprazole 20 MG capsule  Commonly known as: priLOSEC   20 mg, Oral, Daily             No Known Allergies      Discharge Disposition:  Home or Self Care    Diet:  Hospital:  Diet Order   Procedures   • Diet Regular; Cardiac          CODE STATUS:    Code Status and Medical Interventions:   Ordered at: 02/11/21 2138     Level Of Support Discussed With:    Patient     Code Status:    CPR     Medical Interventions (Level of Support Prior to Arrest):    Full       No future appointments.    Additional Instructions for the Follow-ups that You Need to Schedule     Discharge Follow-up with PCP   As directed       Currently Documented PCP:    Vandana Tan MD    PCP Phone Number:    [phone]     Follow Up Details: after 2/24/21 once out of COVID quarantine to f/u post-COVID  illness and assess for any residual symptoms etc                     Archana Olivera MD  02/15/21      Time Spent on Discharge:  I spent  35  minutes on this discharge activity which included: face-to-face encounter with the patient, reviewing the data in the system, coordination of the care with the nursing staff as well as consultants, documentation, and entering orders.

## 2021-02-16 ENCOUNTER — TRANSITIONAL CARE MANAGEMENT TELEPHONE ENCOUNTER (OUTPATIENT)
Dept: CALL CENTER | Facility: HOSPITAL | Age: 51
End: 2021-02-16

## 2021-02-16 LAB
BACTERIA SPEC AEROBE CULT: NORMAL
BACTERIA SPEC AEROBE CULT: NORMAL
IL6 SERPL-MCNC: 21.3 PG/ML (ref 0–13)

## 2021-02-16 NOTE — OUTREACH NOTE
Call Center TCM Note      Responses   [facility] patient discharged from?  [place]   Does the patient have one of the following disease processes/diagnoses(primary or secondary)?  COVID-19   COVID-19 underlying condition?  None   TCM attempt successful?  Yes   Call start time  1317   Call end time  1335   Discharge diagnosis  PNA due to Covid 19   Is patient permission given to speak with other caregiver?  No   Meds reviewed with patient/caregiver?  Yes   Is the patient having any side effects they believe may be caused by any medication additions or changes?  No   Does the patient have all medications ordered at discharge?  Yes   Is the patient taking all medications as directed (includes completed medication regime)?  Yes   Comments regarding appointments  Telephone Visit with FÉLIX Manzo Monday Feb 22, 2021 10:45 AM   Does the patient have a primary care provider?   Yes   Comments regarding PCP  Vandana Tan MD PCP. 02/25/2021 10:30 a.m.   Does the patient have an appointment with their PCP or specialist within 7 days of discharge?  Greater than 7 days   What is preventing the patient from scheduling follow up appointments within 7 days of discharge?  -- [Patient on home quarantine until 2/24/21]   Nursing Interventions  Verified appointment date/time/provider   Has the patient kept scheduled appointments due by today?  N/A   Has home health visited the patient within 72 hours of discharge?  N/A   Psychosocial issues?  No   Did the patient receive a copy of their discharge instructions?  Yes   Did the patient receive a copy of COVID-19 specific instructions?  Yes   Nursing interventions  Reviewed instructions with patient   What is the patient's perception of their health status since discharge?  Improving   Does the patient have any of the following symptoms?  Cough [Patient reports taste/smell returned. ]   Nursing Interventions  Nurse provided patient education   Pulse Ox  monitoring  Intermittent   Pulse Ox device source  Hospital   O2 Sat comments  97% on room air,  checked during call.    O2 Sat: education provided  Sat levels, Monitoring frequency, When to seek care   O2 Sat education comments  Patinent to seek treatment if unable to maintain O2 sat 92% or above.    Is the patient/caregiver able to teach back steps to recovery at home?  Set small, achievable goals for return to baseline health, Rest and rebuild strength, gradually increase activity, Eat a well-balance diet   If the patient is a current smoker, are they able to teach back resources for cessation?  Not a smoker   Is the patient/caregiver able to teach back the hierarchy of who to call/visit for symptoms/problems? PCP, Specialist, Home health nurse, Urgent Care, ED, 911  Yes   TCM call completed?  Yes   Wrap up additional comments  Patient self isolating from other family members in home. Patient's 20 day quarantine ending 2/24/21. Patient reports health dept release family members from quarantine today. Discussed continuing to self isolate from family until his quarantine up. Discussed disinfecting high touch surfaces in common areas of home.           Neyda Montanez RN    2/16/2021, 13:35 EST

## 2021-02-17 ENCOUNTER — READMISSION MANAGEMENT (OUTPATIENT)
Dept: CALL CENTER | Facility: HOSPITAL | Age: 51
End: 2021-02-17

## 2021-02-17 NOTE — OUTREACH NOTE
COVID-19 Week 1 Survey      Responses   [facility] patient discharged from?  [place]   Does the patient have one of the following disease processes/diagnoses(primary or secondary)?  COVID-19   COVID-19 underlying condition?  None   Call Number  Call 2   Week 1 Call successful?  No   Discharge diagnosis  PNA due to Covid 19          Payton Nance RN

## 2021-02-18 ENCOUNTER — READMISSION MANAGEMENT (OUTPATIENT)
Dept: CALL CENTER | Facility: HOSPITAL | Age: 51
End: 2021-02-18

## 2021-02-18 NOTE — OUTREACH NOTE
COVID-19 Week 1 Survey      Responses   [facility] patient discharged from?  [place]   Does the patient have one of the following disease processes/diagnoses(primary or secondary)?  COVID-19   COVID-19 underlying condition?  None   Call Number  Call 3   Week 1 Call successful?  Yes   Call start time  1220   Call end time  1223   Discharge diagnosis  PNA due to Covid 19   What is the patient's perception of their health status since discharge?  Improving   Does the patient have any of the following symptoms?  Cough   Pulse Ox monitoring  Intermittent   O2 Sat comments  96%    COVID-19 call completed?  Yes   Wrap up additional comments  Improving, still has cough.  Recommended using a humidifier.            Swathi Alfaro, RN

## 2021-02-22 ENCOUNTER — OFFICE VISIT (OUTPATIENT)
Dept: CARDIOLOGY | Facility: HOSPITAL | Age: 51
End: 2021-02-22

## 2021-02-22 ENCOUNTER — TELEPHONE (OUTPATIENT)
Dept: INTERNAL MEDICINE | Facility: CLINIC | Age: 51
End: 2021-02-22

## 2021-02-22 NOTE — TELEPHONE ENCOUNTER
PATIENT CALLED AND HE HAS A HOSPITAL FOLLOW-UP.  HE HAD COVID AND IS IN QUARANTINE UNTIL Friday. HE HAD PNEUMONIA ON TOP OF THAT.  HE STILL HAS A COUGH, AND HIS LAST DAY OF QUARANTINE IS Friday.  HE WANTS TO KNOW IF HE CAN STILL COME IN FOR HIS APPOINTMENT ON Friday.    PLEASE CONTACT PATIENT TO ADVISE.    CALLBACK:  [phone]

## 2021-02-23 ENCOUNTER — READMISSION MANAGEMENT (OUTPATIENT)
Dept: CALL CENTER | Facility: HOSPITAL | Age: 51
End: 2021-02-23

## 2021-02-23 NOTE — OUTREACH NOTE
COVID-19 Week 2 Survey      Responses   [facility] patient discharged from?  [place]   Does the patient have one of the following disease processes/diagnoses(primary or secondary)?  COVID-19   COVID-19 underlying condition?  None   Call Number  Call 1   COVID-19 Week 2: Call 1 attempt successful?  Yes   Call start time  1247   Call end time  1251   Discharge diagnosis  PNA due to Covid 19   Meds reviewed with patient/caregiver?  Yes   Is the patient having any side effects they believe may be caused by any medication additions or changes?  No   Does the patient have all medications ordered at discharge?  Yes   Is the patient taking all medications as directed (includes completed medication regime)?  Yes   Does the patient have a primary care provider?   Yes   Comments regarding PCP  Vandana Tan MD PCP. 3/1/2021    Does the patient have an appointment with their PCP or specialist within 7 days of discharge?  Greater than 7 days   What is preventing the patient from scheduling follow up appointments within 7 days of discharge?  Earlier appointment not available   Nursing Interventions  Verified appointment date/time/provider   Has the patient kept scheduled appointments due by today?  N/A   Psychosocial issues?  No   What is the patient's perception of their health status since discharge?  Improving   Does the patient have any of the following symptoms?  Cough   Pulse Ox monitoring  Intermittent   Pulse Ox device source  Hospital   O2 Sat comments  97% RA   O2 Sat: education provided  Sat levels, Monitoring frequency, When to seek care   Is the patient/caregiver able to teach back steps to recovery at home?  Set small, achievable goals for return to baseline health, Rest and rebuild strength, gradually increase activity, Eat a well-balance diet   If the patient is a current smoker, are they able to teach back resources for cessation?  Not a smoker   Is the patient/caregiver able to teach back  the hierarchy of who to call/visit for symptoms/problems? PCP, Specialist, Home health nurse, Urgent Care, ED, 911  Yes   COVID-19 call completed?  Yes   Wrap up additional comments  Pt states he is feeling better,  states he knows it will take a while for him to get over this.          Ellie Madden RN

## 2021-02-26 ENCOUNTER — READMISSION MANAGEMENT (OUTPATIENT)
Dept: CALL CENTER | Facility: HOSPITAL | Age: 51
End: 2021-02-26

## 2021-03-10 ENCOUNTER — HOSPITAL ENCOUNTER (OUTPATIENT)
Dept: GENERAL RADIOLOGY | Facility: HOSPITAL | Age: 51
Discharge: HOME | End: 2021-03-10
Admitting: INTERNAL MEDICINE

## 2021-03-10 ENCOUNTER — OFFICE VISIT (OUTPATIENT)
Dept: INTERNAL MEDICINE | Facility: CLINIC | Age: 51
End: 2021-03-10

## 2021-03-10 VITALS
HEIGHT: 72 IN | DIASTOLIC BLOOD PRESSURE: 84 MMHG | SYSTOLIC BLOOD PRESSURE: 130 MMHG | BODY MASS INDEX: 42.66 KG/M2 | OXYGEN SATURATION: 96 % | TEMPERATURE: 98 F | WEIGHT: 315 LBS

## 2021-03-10 DIAGNOSIS — M25.561 ACUTE PAIN OF RIGHT KNEE: ICD-10-CM

## 2021-03-10 DIAGNOSIS — R60.9 PERIPHERAL EDEMA: Primary | ICD-10-CM

## 2021-03-10 PROCEDURE — 73562 X-RAY EXAM OF KNEE 3: CPT

## 2021-03-10 PROCEDURE — 99214 OFFICE O/P EST MOD 30 MIN: CPT | Performed by: INTERNAL MEDICINE

## 2021-03-10 RX ORDER — HYDROCHLOROTHIAZIDE 25 MG/1
25 TABLET ORAL DAILY
Qty: 30 TABLET | Refills: 5 | Status: SHIPPED | OUTPATIENT
Start: 2021-03-10 | End: 2022-02-25

## 2021-03-10 NOTE — PROGRESS NOTES
"Subjective   Catracho Sahu is a 50 y.o. male here for swelling in legs and right knee pain.  Patient denies any injury to the right knee.  He says he does think it is a bit swollen though not today.  It has not been red, he has no history of gout.  It is worse with activity.  He says he knows that his weight plays a role in that, but it has never hurt this bad before.  He also has swelling in the lower extremities which she has noticed recently.  He denies any issues with shortness of breath (other than with activity, baseline), no  complaints.  He says he walked around a lot recently and his legs were very swollen.  He does mostly sit during the day at his job from home.  His legs are also swollen at the end of the day.    I have reviewed the following portions of the patient's history and confirmed they are accurate: current medications, past medical history, past social history and problem list     I have personally completed the patient's review of systems.    Review of Systems:  General: negative  CV: Peripheral edema  Respiratory: SALEH (baseline)  Skin: Negative  MSK: Knee pain    Objective   /84   Temp 98 °F (36.7 °C) (Temporal)   Ht 182.9 cm (72\")   Wt (!) 211 kg (464 lb 12.8 oz)   BMI 63.04 kg/m²     Physical Exam  Vitals reviewed.   Constitutional:       Appearance: He is well-developed.   Cardiovascular:      Comments: 1+ pitting edema in bilateral lower extremities around the ankles  Pulmonary:      Effort: Pulmonary effort is normal.   Skin:     General: Skin is warm and dry.      Comments: No signs of venous insufficiency dermatitis   Neurological:      Mental Status: He is alert and oriented to person, place, and time.   Psychiatric:         Behavior: Behavior normal.         Thought Content: Thought content normal.         Judgment: Judgment normal.         Assessment/Plan   Diagnoses and all orders for this visit:    1. Peripheral edema (Primary)  -     Duplex Venous Lower " Extremity - Bilateral CAR  -     hydroCHLOROthiazide (HYDRODIURIL) 25 MG tablet; Take 1 tablet by mouth Daily.  Dispense: 30 tablet; Refill: 5  -Start HCTZ, check vein patency  -Discussed that peripheral edema is often seen in obesity and of course recommend weight loss    2. Acute pain of right knee  -     XR Knee 3 View Right  -X-ray to start, again discussed obesity but in the meantime, patient could benefit from steroid injection or other medications             Myra Roque MA    Please note that portions of this note were completed with a voice recognition program. Efforts were made to edit the dictations, but occasionally words are mistranscribed.

## 2021-03-11 ENCOUNTER — TELEPHONE (OUTPATIENT)
Dept: INTERNAL MEDICINE | Facility: CLINIC | Age: 51
End: 2021-03-11

## 2021-03-15 ENCOUNTER — TELEPHONE (OUTPATIENT)
Dept: INTERNAL MEDICINE | Facility: CLINIC | Age: 51
End: 2021-03-15

## 2021-03-15 DIAGNOSIS — M25.561 ACUTE PAIN OF RIGHT KNEE: Primary | ICD-10-CM

## 2021-03-15 NOTE — TELEPHONE ENCOUNTER
PATIENT HAD XRAYS DONE ON HIS RIGHT KNEE ON 03/10. HE IS REQUESTING A CALLBACK WITH THE RESULTS OF THAT.     CONTACT: [phone]

## 2021-03-15 NOTE — TELEPHONE ENCOUNTER
Just shows degenerative changes in the right knee.  Some arthritis.  Does he want me to refer him to Ortho?  I think he could benefit from a steroid injection.

## 2021-03-16 ENCOUNTER — TELEPHONE (OUTPATIENT)
Dept: INTERNAL MEDICINE | Facility: CLINIC | Age: 51
End: 2021-03-16

## 2021-03-24 ENCOUNTER — OFFICE VISIT (OUTPATIENT)
Dept: ORTHOPEDIC SURGERY | Facility: CLINIC | Age: 51
End: 2021-03-24

## 2021-03-24 VITALS
HEART RATE: 89 BPM | HEIGHT: 72 IN | SYSTOLIC BLOOD PRESSURE: 150 MMHG | BODY MASS INDEX: 42.66 KG/M2 | DIASTOLIC BLOOD PRESSURE: 98 MMHG | WEIGHT: 315 LBS

## 2021-03-24 DIAGNOSIS — G89.29 CHRONIC PAIN OF RIGHT KNEE: ICD-10-CM

## 2021-03-24 DIAGNOSIS — I10 BENIGN ESSENTIAL HTN: Primary | ICD-10-CM

## 2021-03-24 DIAGNOSIS — E66.01 MORBID OBESITY WITH BMI OF 60.0-69.9, ADULT (HCC): ICD-10-CM

## 2021-03-24 DIAGNOSIS — M25.561 CHRONIC PAIN OF RIGHT KNEE: ICD-10-CM

## 2021-03-24 PROCEDURE — 99203 OFFICE O/P NEW LOW 30 MIN: CPT | Performed by: ORTHOPAEDIC SURGERY

## 2021-03-24 NOTE — PROGRESS NOTES
"      [facility] Note    Subjective     CC: Pain of the Right Knee      HPI     Catracho Sahu is a 50 y.o. male who presents with new problem of: right knee pain.  Onset: atraumatic and gradual in nature. The issue has been ongoing for 1 year(s). Pain is a 7/10 on the pain scale. Pain is described as dull, aching, throbbing and shooting. Associated symptoms include pain and swelling. The pain is worse with walking, standing, sitting and climbing stairs; ibuprofen improve the pain. Previous treatments have included: NSAIDS and weight loss.    I have reviewed the following portions of the patient's history:History of Present Illness and review of systems.    He has tried ibuprofen.      Review of Systems   Constitutional: Negative.  Negative for chills, fatigue and fever.   HENT: Negative.  Negative for congestion and dental problem.    Eyes: Negative.  Negative for blurred vision.   Respiratory: Negative.  Negative for shortness of breath.    Cardiovascular: Negative.  Negative for leg swelling.   Gastrointestinal: Negative.  Negative for abdominal pain.   Endocrine: Negative.  Negative for polyuria.   Genitourinary: Negative.  Negative for difficulty urinating.   Musculoskeletal: Positive for arthralgias.   Skin: Negative.    Allergic/Immunologic: Negative.    Neurological: Negative.    Hematological: Negative.  Negative for adenopathy.   Psychiatric/Behavioral: Negative.  Negative for behavioral problems.       ROS:    Constiutional:Pt denies fever, chills, nausea, or vomiting.  MSK:as above      Objective      Past Medical History  Past Medical History:   Diagnosis Date   • Fractures    • Hypertension    • Sleep apnea          Physical Exam  /98   Pulse 89   Ht 182.9 cm (72.01\")   Wt (!) 211 kg (465 lb 2.7 oz)   BMI 63.07 kg/m²     Body mass index is 63.07 kg/m².    Patient is well nourished and well developed.        Ortho Exam  His knees are morbidly obese.  Medial joint " tenderness.    Imaging/Labs/EMG Reviewed:  Imaging Results (Last 24 Hours)     ** No results found for the last 24 hours. **      I viewed his x-rays from March 10 which show no significant abnormality    Assessment:  1. Benign essential HTN    2. Chronic pain of right knee    3. Morbid obesity with BMI of 60.0-69.9, adult (CMS/[facility])        Plan:  1. Recommend over the counter anti-inflammatories for pain and/or swelling  2. I recommended weight loss.  3. Continue ibuprofen    Follow Up:   Return if symptoms worsen or fail to improve.      Medical Decision Making  Management Options : Low - OTC Drugs and 1 of 2 Categories = Category 1 - Review of Tests and Documents Category 2 - Assessment requiring an independent interpretation of tests         Alexis Cartagena M.D., FAAOS  Orthopedic Surgeon  Fellowship Trained Sports Medicine  [facility]  Orthopedics and Sports Medicine  [address] [place] [address], [address]  [place] [address]

## 2021-05-21 ENCOUNTER — TELEPHONE (OUTPATIENT)
Dept: INTERNAL MEDICINE | Facility: CLINIC | Age: 51
End: 2021-05-21

## 2021-05-21 NOTE — TELEPHONE ENCOUNTER
Fever may be causing the red face. Have any reaction to the first shot? Any swelling in face or rash on body? Make sure no trouble breathing or swelling/tingling in mouth/tongue.

## 2021-05-21 NOTE — TELEPHONE ENCOUNTER
Caller: Catracho Sahu    Relationship: Self    Best call back number: [phone]    What is the best time to reach you: anytime    Who are you requesting to speak with (clinical staff, provider,  specific staff member): Dr. Tan    Do you know the name of the person who called: Catracho    What was the call regarding: patient had 2nd Covid shot on 05/20/21 and now his face looks like he has been sunburned, and has a slight fever and cold chills.  He is pretty concerned about the face looking like sunburned    Do you require a callback: YES!!

## 2021-10-21 ENCOUNTER — TELEMEDICINE (OUTPATIENT)
Dept: INTERNAL MEDICINE | Facility: CLINIC | Age: 51
End: 2021-10-21

## 2021-10-21 DIAGNOSIS — M54.42 ACUTE BILATERAL LOW BACK PAIN WITH BILATERAL SCIATICA: Primary | ICD-10-CM

## 2021-10-21 DIAGNOSIS — M54.41 ACUTE BILATERAL LOW BACK PAIN WITH BILATERAL SCIATICA: Primary | ICD-10-CM

## 2021-10-21 PROCEDURE — 99213 OFFICE O/P EST LOW 20 MIN: CPT | Performed by: PHYSICIAN ASSISTANT

## 2021-10-21 RX ORDER — METAXALONE 800 MG/1
800 TABLET ORAL 3 TIMES DAILY PRN
Qty: 90 TABLET | Refills: 1 | Status: SHIPPED | OUTPATIENT
Start: 2021-10-21 | End: 2021-10-22 | Stop reason: CLARIF

## 2021-10-21 NOTE — PROGRESS NOTES
[facility] PRIMARY CARE  [address]   [facility]ly [facility] [address]  Dept: [phone]  Dept Fax: [phone]  Loc: [phone]  Loc Fax: [phone]    Catracho Sahu  1970    Televisit Note    You have chosen to receive care through a telephone visit. Do you consent to use a telephone visit for your medical care today? Yes    History of Present Illness:  Catrahco Sahu is a 51 y.o. male who presents via telehealth visit for acute low back pain.  Patient states he was trying to put on his pants yesterday when he pulled something in his back and immediately fell back in his bed in excruciating pain.  Patient describes a sharp and burning pain that radiates from his lower back down into both his legs approximately to the level of the midcalf bilaterally.  Patient has taken NSAIDs with minimal relief of symptoms.  Patient rates pain as 9 out of 10 at its worst.  Patient has also been using a heating pad.    The following portions of the patient's history were reviewed and updated as appropriate: allergies, current medications, past family history, past medical history, past social history, past surgical history, and problem list.    This visit was scheduled as a phone visit to comply with patient safety concerns in accordance with CDC recommendations.  Time spent in discussion with the patient was 20 minutes.     Medications    Current Outpatient Medications:   •  Diclofenac Sodium (VOLTAREN) 1 % gel gel, Apply 1 gram topically to indicated area 4 times daily as needed for mild to moderate pain., Disp: 100 g, Rfl: 2  •  hydroCHLOROthiazide (HYDRODIURIL) 25 MG tablet, Take 1 tablet by mouth Daily., Disp: 30 tablet, Rfl: 5  •  lisinopril (PRINIVIL,ZESTRIL) 40 MG tablet, Take 1 tablet by mouth Daily., Disp: 90 tablet, Rfl: 3  •  metaxalone (Skelaxin) 800 MG tablet, Take 1 tablet by mouth 3 (Three) Times a Day As Needed for Muscle Spasms., Disp: 90 tablet, Rfl:  1  •  omeprazole (priLOSEC) 20 MG capsule, Take 1 capsule by mouth Daily., Disp: 30 capsule, Rfl: 5    Subjective  No Known Allergies     Past Medical History:   Diagnosis Date   • Fractures    • Hypertension    • Sleep apnea        Past Surgical History:   Procedure Laterality Date   • COLONOSCOPY  2008   • TONSILLECTOMY  2002   • UVULOPALATOPHARYNGOPLASTY         Family History   Problem Relation Age of Onset   • Migraines Mother    • Hypertension Father    • Stroke Father    • Hypertension Paternal Aunt    • Cancer Maternal Grandmother    • Parkinsonism Maternal Grandfather    • Heart attack Paternal Grandmother    • Hypertension Paternal Grandmother    • Dementia Paternal Grandfather    • Stroke Paternal Grandfather         Social History     Socioeconomic History   • Marital status:    Tobacco Use   • Smoking status: Never Smoker   • Smokeless tobacco: Never Used   Substance and Sexual Activity   • Alcohol use: No   • Drug use: No   • Sexual activity: Defer         Objective  There were no vitals filed for this visit.  There is no height or weight on file to calculate BMI.    Assessment  Diagnoses and all orders for this visit:    1. Acute bilateral low back pain with bilateral sciatica (Primary)  -     XR Spine Lumbar 2 or 3 View; Future  -     Ambulatory Referral to Physical Therapy Evaluate and treat    Other orders  -     Diclofenac Sodium (VOLTAREN) 1 % gel gel; Apply 1 gram topically to indicated area 4 times daily as needed for mild to moderate pain.  Dispense: 100 g; Refill: 2  -     metaxalone (Skelaxin) 800 MG tablet; Take 1 tablet by mouth 3 (Three) Times a Day As Needed for Muscle Spasms.  Dispense: 90 tablet; Refill: 1        Plan    1. Acute bilateral low back pain with bilateral sciatica (Primary)- Voltaren gel as directed.  Skelaxin 800 mg 3 times daily as needed.  Refer to physical therapy.  Will obtain x-ray.  Will likely need MRI.    Follow-up in 6 weeks.    Demetri Yousif PA-C

## 2021-10-21 NOTE — PATIENT INSTRUCTIONS
Douleur dorsale aiguë chez l’adulte  Acute Back Pain, Adult  La douleur dorsale aiguë est soudaine et souvent de courte durée. Azalia est souvent provoquée par un traumatisme aux muscles et aux tissus du dos. Le traumatisme peut être causé par :  · Un muscle ou un ligament distendu ou déchiré (tendu). Les ligaments sont schuyler tissus renae relient les os entre eux. Le fait de soulever un objet de manière incorrecte peut causer une douleur dorsale.  · L’usure (dégénérescence) schuyler disques vertébraux. Les disques vertébraux sont schuyler tissus circulaires renae servent de tampon entre les os de la colonne vertébrale (vertèbres).  · Un mouvement de torsion, par exemple pendant la pratique d’un sport ou du jardinage.  · Un coup dans le dos.  · De l’arthrite.  Il pourra être nécessaire de passer un examen physique, schuyler tests de laboratoire et schuyler examens d’imagerie pour déterminer la cause de mikayla douleurs. En général, la douleur dorsale aiguë disparaît avec le repos et une prise en charge à domicile.  Suivez les instructions suivantes à domicile :  Prise en charge de la douleur, de la rigidité et schuyler gonflements  · Le traitement pourra comprendre schuyler médicaments contre la douleur et l’inflammation jovita par voie orale ou appliqués emely la peau, schuyler médicaments antidouleur emely ordonnance ou schuyler relaxants musculaires. Prenez mikayla médicaments en vente declan et emely ordonnance en suivant scrupuleusement les instructions de votre prestataire de soins de santé.  · Votre prestataire de soins de santé pourrait vous recommander d’appliquer de la glace au cours schuyler 24 à 48 premières heures suivant l’apparition de la douleur. Pour ce faire :  ? Mettez de la glace dans un sac en plastique.  ? Placez une serviette entre votre peau et le sac.  ? Laissez la glace en place pendant 20 minutes, 2 à 3 fois par jour.  · Si votre prestataire de soins de santé vous le recommande, appliquez de la chaleur emely la zone affectée en suivant ses instructions.  Utilisez la source de chaleur que vous recommande votre prestataire de soins de santé, par exemple une compresse à chaleur humide ou un coussin chauffant.  ? Placez une serviette entre votre peau et la source de chaleur.  ? Laissez la source de chaleur en place pendant 20 à 30 minutes.  ? Retirez la source de chaleur si votre peau devient rouge vif. Laura est particulièrement important si vous ne ressentez pas la douleur, la chaleur ou le froid. Vous risqueriez de vous brûler.  Activités    · Ne restez pas au lit. Rester au lit plus d’un jour ou deux peut retarder votre guérison.  · Tenez-vous kushal droit(e) lorsque vous êtes robert(e) ou debout. Évitez de vous pencher vers l’raul lorsque vous êtes robert(e) ou de vous courber lorsque vous êtes debout.  ? Si vous travaillez à un bureau, asseyez-vous à proximité pour ne pas avoir à vous pencher. Gardez votre menton rentré. Gardez votre cou en arrière et gardez mikayla coudes pliés à un angle de 90 degrés (angle droit).  ? Asseyez-vous haut et près du volant lorsque vous conduisez. Ajoutez un soutien lombaire à votre siège de voiture, si nécessaire.  · Faites de courtes promenades emely schuyler surfaces planes dès que vous le pouvez. Essayez de rallonger chaque jour la distance parcourue.  · Ne restez pas robert(e), au volant ou debout sans bouger pendant plus de 30 minutes. Le fait de rester robert(e) ou debout pendant une longue période peut exercer une pression emely votre dos.  · Ne conduisez pas ou n’utilisez pas d’engins lourds lorsque vous prenez schuyler médicaments antidouleur emely ordonnance.  · Utilisez les bonnes techniques de levage. Lorsque vous vous penchez pour soulever schuyler objets, adoptez une position renae évitera de mettre une pression emely votre dos :  ? Fléchissez mikayla genoux.  ? Gardez la charge près de votre corps.  ? Évitez toute torsion.  · Faites de l’exercice régulièrement en suivant les recommandations de votre prestataire de soins de santé. L’exercice permet à votre  dos de guérir plus rapidement et empêche les traumatismes du dos en gardant les muscles forts et flexibles.  · Travaillez avec un kinésithérapeute pour élaborer un plan d’exercices sans risque, conformément aux recommandations de votre prestataire de soins de santé. Faites donavan exercices tel que recommandé par votre kinésithérapeute.    Mode de vie  · Maintenez un poids jazmine. Le surpoids exerce une pression emely le dos et empêche d’avoir une bonne posture.  · Évitez les activités ou les situations renae génèrent de l’anxiété ou du stress. Le stress et l’anxiété augmentent la tension musculaire et peuvent aggraver les maux de dos. Apprenez les méthodes de gestion de l’anxiété et du stress, par exemple en pratiquant une activité physique.  Instructions générales  · Dormez emely un matelas ferme dans une position confortable. Essayez de vous allonger emely le côté avec les genoux légèrement pliés. Si vous dormez emely le dos, placez un oreiller sous mikayla genoux.  · Respectez votre plan de traitement en suivant les recommandations de votre prestataire de soins de santé. Ce plan de traitement pourrait comprendre :  ? Une thérapie cognitivo-comportementale.  ? Donavan séances d’acupuncture ou de massothérapie.  ? La méditation ou le yoga.  Prenez contact avec un prestataire de soins de santé si :  · Vous ressentez une douleur renae n’est pas soulagée par le repos ou les médicaments.  · Vous ressentez une douleur croissante renae irradie dans mikayla jambes ou mikayla fesses.  · Mikayla douleurs ne diminuent pas après 2 semaines.  · Vous ressentez donavan douleurs pendant la nuit.  · Vous perdez du poids de façon inexpliquée.  · Vous avez de la fièvre ou donavan frissons.  Demandez immédiatement de l’aide si :  · Vous présentez de nouveaux problèmes intestinaux ou de contrôle de la vessie.  · Mikayla bras ou mikayla jambes sont faibles ou engourdis de façon inhabituelle.  · Vous souffrez de nausées ou vomissements.  · Vous ressentez donavan douleurs abdominales.  · Vous  êtes proche du malaise.  Résumé  · La douleur dorsale aiguë est soudaine et souvent de courte durée.  · Utilisez les bonnes techniques de levage. Lorsque vous vous penchez pour soulever schuyler objets, adoptez une position renae évitera de mettre une pression emely votre dos.  · Prenez schuyler médicaments en vente declan ou emely ordonnance et appliquez de la chaleur ou de la glace en suivant les recommandations de votre prestataire de soins de santé.  Jaspreet conseils et renseignements ne sauraient se substituer à l’fernando médical de votre prestataire de soins de santé. Par conséquent, il est primordial de parler de toutes mikayla préoccupations avec votre prestataire de soins de santé.  Document Revised: 01/22/2021 Document Reviewed: 10/04/2018  Elsevier Patient Education © 2021 [employer]

## 2021-10-22 ENCOUNTER — TELEPHONE (OUTPATIENT)
Dept: INTERNAL MEDICINE | Facility: CLINIC | Age: 51
End: 2021-10-22

## 2021-10-22 DIAGNOSIS — M62.838 MUSCLE SPASM: Primary | ICD-10-CM

## 2021-10-22 RX ORDER — CYCLOBENZAPRINE HCL 10 MG
10 TABLET ORAL NIGHTLY PRN
Qty: 30 TABLET | Refills: 1 | Status: SHIPPED | OUTPATIENT
Start: 2021-10-22 | End: 2022-03-01

## 2021-12-27 DIAGNOSIS — I10 BENIGN ESSENTIAL HTN: ICD-10-CM

## 2021-12-27 RX ORDER — LISINOPRIL 40 MG/1
40 TABLET ORAL DAILY
Qty: 90 TABLET | Refills: 3 | Status: SHIPPED | OUTPATIENT
Start: 2021-12-27 | End: 2022-03-01 | Stop reason: ALTCHOICE

## 2022-01-19 NOTE — OUTREACH NOTE
COVID-19 Week 2 Survey      Responses   [facility] patient discharged from?  [place]   Does the patient have one of the following disease processes/diagnoses(primary or secondary)?  COVID-19   COVID-19 underlying condition?  None   Call Number  Call 2   COVID-19 Week 2: Call 1 attempt successful?  Yes   Call start time  1033   Call end time  1033   Discharge diagnosis  PNA due to Covid 19   Meds reviewed with patient/caregiver?  Yes   Is the patient having any side effects they believe may be caused by any medication additions or changes?  No   Does the patient have all medications ordered at discharge?  Yes   Is the patient taking all medications as directed (includes completed medication regime)?  Yes   Does the patient have a primary care provider?   Yes   Has the patient kept scheduled appointments due by today?  N/A   Psychosocial issues?  No   Did the patient receive a copy of their discharge instructions?  Yes   Did the patient receive a copy of COVID-19 specific instructions?  Yes   Nursing interventions  Reviewed instructions with patient, Educated on MyChart   What is the patient's perception of their health status since discharge?  Improving   Does the patient have any of the following symptoms?  None   Nursing Interventions  Nurse provided patient education   Pulse Ox monitoring  Intermittent   Is the patient/caregiver able to teach back steps to recovery at home?  Set small, achievable goals for return to baseline health, Rest and rebuild strength, gradually increase activity   If the patient is a current smoker, are they able to teach back resources for cessation?  Not a smoker   Is the patient/caregiver able to teach back the hierarchy of who to call/visit for symptoms/problems? PCP, Specialist, Home health nurse, Urgent Care, ED, 911  Yes   COVID-19 call completed?  Yes          Anisa Cedeno RN   Please advise

## 2022-02-21 PROBLEM — G47.33 OSA (OBSTRUCTIVE SLEEP APNEA): Status: ACTIVE | Noted: 2022-02-21

## 2022-02-21 PROBLEM — I10 ESSENTIAL HYPERTENSION: Status: ACTIVE | Noted: 2022-02-21

## 2022-02-24 RX ORDER — OMEPRAZOLE 20 MG/1
20 CAPSULE, DELAYED RELEASE ORAL DAILY
Qty: 30 CAPSULE | Refills: 5 | Status: SHIPPED | OUTPATIENT
Start: 2022-02-24 | End: 2023-03-30 | Stop reason: SDUPTHER

## 2022-02-25 DIAGNOSIS — R60.9 PERIPHERAL EDEMA: ICD-10-CM

## 2022-02-25 RX ORDER — HYDROCHLOROTHIAZIDE 25 MG/1
25 TABLET ORAL DAILY
Qty: 30 TABLET | Refills: 5 | Status: SHIPPED | OUTPATIENT
Start: 2022-02-25 | End: 2023-03-30 | Stop reason: SDUPTHER

## 2022-03-01 ENCOUNTER — OFFICE VISIT (OUTPATIENT)
Dept: CARDIOLOGY | Facility: CLINIC | Age: 52
End: 2022-03-01

## 2022-03-01 VITALS
DIASTOLIC BLOOD PRESSURE: 80 MMHG | OXYGEN SATURATION: 96 % | HEART RATE: 86 BPM | SYSTOLIC BLOOD PRESSURE: 144 MMHG | HEIGHT: 73 IN | WEIGHT: 315 LBS | BODY MASS INDEX: 41.75 KG/M2

## 2022-03-01 DIAGNOSIS — R06.02 SHORTNESS OF BREATH ON EXERTION: ICD-10-CM

## 2022-03-01 DIAGNOSIS — R60.0 LOWER EXTREMITY EDEMA: ICD-10-CM

## 2022-03-01 DIAGNOSIS — I10 ESSENTIAL HYPERTENSION: Primary | ICD-10-CM

## 2022-03-01 DIAGNOSIS — G47.33 OSA (OBSTRUCTIVE SLEEP APNEA): ICD-10-CM

## 2022-03-01 PROCEDURE — 99214 OFFICE O/P EST MOD 30 MIN: CPT | Performed by: NURSE PRACTITIONER

## 2022-03-01 PROCEDURE — 93000 ELECTROCARDIOGRAM COMPLETE: CPT | Performed by: NURSE PRACTITIONER

## 2022-03-01 RX ORDER — NYSTATIN 100000 U/G
CREAM TOPICAL AS NEEDED
COMMUNITY
Start: 2022-02-14 | End: 2022-10-14

## 2022-03-01 RX ORDER — LOSARTAN POTASSIUM 100 MG/1
100 TABLET ORAL DAILY
COMMUNITY
Start: 2022-02-15 | End: 2022-03-24 | Stop reason: SDUPTHER

## 2022-03-01 RX ORDER — TORSEMIDE 5 MG/1
5 TABLET ORAL DAILY PRN
Qty: 30 TABLET | Refills: 5 | Status: SHIPPED | OUTPATIENT
Start: 2022-03-01 | End: 2022-05-25

## 2022-03-14 ENCOUNTER — APPOINTMENT (OUTPATIENT)
Dept: CARDIOLOGY | Facility: HOSPITAL | Age: 52
End: 2022-03-14

## 2022-03-23 DIAGNOSIS — I10 ESSENTIAL HYPERTENSION, BENIGN: Primary | ICD-10-CM

## 2022-03-23 NOTE — TELEPHONE ENCOUNTER
Incoming Refill Request      Medication requested (name and dose): Losartan 100mg tablets    [facility] where [facility]    Additional details provided by patient: Take 1 tablet by mouth every day    Best call back number: [phone]    Does the patient have less than a 3 day supply:  [] Yes  [x] No    Sasha Parker Rep  03/23/22, 15:43 EDT

## 2022-03-24 RX ORDER — LOSARTAN POTASSIUM 100 MG/1
100 TABLET ORAL DAILY
Qty: 90 TABLET | Refills: 0 | Status: SHIPPED | OUTPATIENT
Start: 2022-03-24 | End: 2022-05-26

## 2022-05-25 ENCOUNTER — OFFICE VISIT (OUTPATIENT)
Dept: FAMILY MEDICINE CLINIC | Facility: CLINIC | Age: 52
End: 2022-05-25

## 2022-05-25 VITALS — WEIGHT: 315 LBS | HEIGHT: 73 IN | BODY MASS INDEX: 41.75 KG/M2

## 2022-05-25 DIAGNOSIS — M25.561 ACUTE PAIN OF RIGHT KNEE: Primary | ICD-10-CM

## 2022-05-25 DIAGNOSIS — I10 ESSENTIAL HYPERTENSION, BENIGN: ICD-10-CM

## 2022-05-25 PROCEDURE — 99213 OFFICE O/P EST LOW 20 MIN: CPT | Performed by: STUDENT IN AN ORGANIZED HEALTH CARE EDUCATION/TRAINING PROGRAM

## 2022-05-25 RX ORDER — DICLOFENAC SODIUM 75 MG/1
75 TABLET, DELAYED RELEASE ORAL 2 TIMES DAILY
Qty: 60 TABLET | Refills: 0 | Status: SHIPPED | OUTPATIENT
Start: 2022-05-25 | End: 2022-06-29

## 2022-05-25 NOTE — PROGRESS NOTES
"Chief Complaint  Knee Injury (Stepped in a hole while golfing 10 days ago)    Subjective          Catracho Sahu presents to [facility] PRIMARY CARE  History of Present Illness     He states that he was golfing about 10 days ago and he has been having trouble with pain in the Right knee ever since. He states that he has some instability and pain with pivoting. He states that he has not had any injuries in the past that he is aware of. No previous surgeries. Tylenol does not help much. He has not had NSAIDS.     Objective   Vital Signs:   Ht 185.4 cm (73\")   Wt (!) 230 kg (506 lb)   BMI 66.76 kg/m²     Body mass index is 66.76 kg/m².    Review of Systems    Past History:  Medical History: has a past medical history of Fractures, GERD (gastroesophageal reflux disease), Hypertension, and Sleep apnea.   Surgical History: has a past surgical history that includes Colonoscopy (2008); Tonsillectomy (2002); and Uvulopalatopharyngoplasty.   Family History: family history includes Cancer in his maternal grandmother; Dementia in his paternal grandfather; Heart attack in his paternal grandmother; Hypertension in his father, paternal aunt, and paternal grandmother; Migraines in his mother; Parkinsonism in his maternal grandfather; Stroke in his father and paternal grandfather.   Social History: reports that he has never smoked. He has never used smokeless tobacco. He reports that he does not drink alcohol and does not use drugs.      Current Outpatient Medications:   •  hydroCHLOROthiazide (HYDRODIURIL) 25 MG tablet, TAKE 1 TABLET BY MOUTH DAILY, Disp: 30 tablet, Rfl: 5  •  losartan (COZAAR) 100 MG tablet, Take 1 tablet by mouth Daily., Disp: 90 tablet, Rfl: 0  •  nystatin (MYCOSTATIN) 461586 UNIT/GM cream, As Needed., Disp: , Rfl:   •  omeprazole (priLOSEC) 20 MG capsule, TAKE 1 CAPSULE BY MOUTH DAILY, Disp: 30 capsule, Rfl: 5  •  diclofenac (VOLTAREN) 75 MG EC tablet, Take 1 tablet by mouth 2 (Two) " Times a Day., Disp: 60 tablet, Rfl: 0    Allergies: Patient has no known allergies.    Physical Exam  Constitutional:       Appearance: He is normal weight.   HENT:      Head: Normocephalic and atraumatic.   Cardiovascular:      Rate and Rhythm: Normal rate and regular rhythm.   Pulmonary:      Effort: Pulmonary effort is normal. No respiratory distress.   Musculoskeletal:      Comments: Pain with valgus and varus stress, pain with palpation of the medial joint line. Negative, anterior and posterior drawer as well as Negative Lachman test.    Neurological:      Mental Status: He is alert.          Result Review :                   Assessment and Plan    Diagnoses and all orders for this visit:    1. Acute pain of right knee (Primary)  Assessment & Plan:  Will do Xray today and contact with results. Diclofenac for inflammation and for pain. Pt ordered and patient to scheduled. Advised follow up in 4-6 weeks to discuss advanced imaging.       Orders:  -     Ambulatory Referral to Physical Therapy  -     XR Knee 3 View Right; Future    Other orders  -     diclofenac (VOLTAREN) 75 MG EC tablet; Take 1 tablet by mouth 2 (Two) Times a Day.  Dispense: 60 tablet; Refill: 0      Follow Up   No follow-ups on file.  Patient was given instructions and counseling regarding his condition or for health maintenance advice. Please see specific information pulled into the AVS if appropriate.     Dorothy Christianson, DO

## 2022-05-25 NOTE — ASSESSMENT & PLAN NOTE
Will do Xray today and contact with results. Diclofenac for inflammation and for pain. Pt ordered and patient to scheduled. Advised follow up in 4-6 weeks to discuss advanced imaging.

## 2022-05-26 RX ORDER — LOSARTAN POTASSIUM 100 MG/1
100 TABLET ORAL DAILY
Qty: 90 TABLET | Refills: 0 | Status: SHIPPED | OUTPATIENT
Start: 2022-05-26 | End: 2022-10-14 | Stop reason: SDUPTHER

## 2022-06-29 RX ORDER — DICLOFENAC SODIUM 75 MG/1
TABLET, DELAYED RELEASE ORAL
Qty: 60 TABLET | Refills: 0 | Status: SHIPPED | OUTPATIENT
Start: 2022-06-29 | End: 2022-10-14

## 2022-08-28 DIAGNOSIS — I10 ESSENTIAL HYPERTENSION, BENIGN: ICD-10-CM

## 2022-08-30 RX ORDER — LOSARTAN POTASSIUM 100 MG/1
100 TABLET ORAL DAILY
Qty: 90 TABLET | Refills: 0 | OUTPATIENT
Start: 2022-08-30

## 2022-08-30 NOTE — TELEPHONE ENCOUNTER
Patient has only seen you once and that was on 02/14/22. He is due for an appointment on medications. When I called, he said he still has two months left on the losartan and he wanted us to hold off on sending. He has an upcoming appointment now on 09/26 and we will get his meds sent over at appointment so they can all be sent together. He wants to get his meds on same schedule

## 2022-10-14 ENCOUNTER — OFFICE VISIT (OUTPATIENT)
Dept: FAMILY MEDICINE CLINIC | Facility: CLINIC | Age: 52
End: 2022-10-14

## 2022-10-14 VITALS
HEIGHT: 73 IN | HEART RATE: 88 BPM | OXYGEN SATURATION: 95 % | DIASTOLIC BLOOD PRESSURE: 80 MMHG | WEIGHT: 315 LBS | SYSTOLIC BLOOD PRESSURE: 144 MMHG | BODY MASS INDEX: 41.75 KG/M2

## 2022-10-14 DIAGNOSIS — I10 ESSENTIAL HYPERTENSION, BENIGN: ICD-10-CM

## 2022-10-14 DIAGNOSIS — Z23 ENCOUNTER FOR IMMUNIZATION: ICD-10-CM

## 2022-10-14 DIAGNOSIS — E66.01 MORBID OBESITY WITH BMI OF 60.0-69.9, ADULT: ICD-10-CM

## 2022-10-14 DIAGNOSIS — K21.9 GASTROESOPHAGEAL REFLUX DISEASE, UNSPECIFIED WHETHER ESOPHAGITIS PRESENT: ICD-10-CM

## 2022-10-14 DIAGNOSIS — I10 HYPERTENSION, ESSENTIAL: Primary | ICD-10-CM

## 2022-10-14 PROCEDURE — 90686 IIV4 VACC NO PRSV 0.5 ML IM: CPT | Performed by: PHYSICIAN ASSISTANT

## 2022-10-14 PROCEDURE — 90471 IMMUNIZATION ADMIN: CPT | Performed by: PHYSICIAN ASSISTANT

## 2022-10-14 PROCEDURE — 99214 OFFICE O/P EST MOD 30 MIN: CPT | Performed by: PHYSICIAN ASSISTANT

## 2022-10-14 RX ORDER — LOSARTAN POTASSIUM 100 MG/1
100 TABLET ORAL DAILY
Qty: 90 TABLET | Refills: 1 | Status: SHIPPED | OUTPATIENT
Start: 2022-10-14 | End: 2023-03-30 | Stop reason: SDUPTHER

## 2022-10-14 NOTE — PROGRESS NOTES
"Chief Complaint  Annual Exam (Physical med refills check up)    Subjective          Catracho Sahu presents to [facility] PRIMARY CARE  History of Present Illness  Patient today for medication recheck and refills.  Since last visit, he did go see cardiology but did not follow-up with getting an echocardiogram that was recommended.  He states he will try and get back in to do that.  He was given med to use prn for edema, which he states is still an issue. He denies any chest pain.  He does get short of breath at times with walking.  He continues to have bilateral knee pain which she associates with his weight.  He does have sleep apnea but is not currently treating.  He states he also missed his last appointment with his sleep specialist and plans to get scheduled.  Hypertension  This is a chronic problem. Associated symptoms include shortness of breath. Pertinent negatives include no blurred vision, chest pain or palpitations.       Objective   Vital Signs:   /80 (BP Location: Left arm, Patient Position: Sitting, Cuff Size: Large Adult)   Pulse 88   Ht 185.4 cm (73\")   Wt (!) 233 kg (514 lb)   SpO2 95%   BMI 67.81 kg/m²     Body mass index is 67.81 kg/m².    Review of Systems   Constitutional: Negative for fatigue.   Eyes: Negative for blurred vision.   Respiratory: Positive for shortness of breath. Negative for cough.    Cardiovascular: Positive for leg swelling. Negative for chest pain and palpitations.   Gastrointestinal: Negative for abdominal pain, diarrhea, nausea and vomiting.   Neurological: Negative for dizziness and headache.       Past History:  Medical History: has a past medical history of Fractures, GERD (gastroesophageal reflux disease), Hypertension, and Sleep apnea.   Surgical History: has a past surgical history that includes Colonoscopy (2008); Tonsillectomy (2002); and Uvulopalatopharyngoplasty.   Family History: family history includes Cancer in his maternal " grandmother; Dementia in his paternal grandfather; Heart attack in his paternal grandmother; Hypertension in his father, paternal aunt, and paternal grandmother; Migraines in his mother; Parkinsonism in his maternal grandfather; Stroke in his father and paternal grandfather.   Social History: reports that he has never smoked. He has never used smokeless tobacco. He reports that he does not drink alcohol and does not use drugs.      Current Outpatient Medications:   •  hydroCHLOROthiazide (HYDRODIURIL) 25 MG tablet, TAKE 1 TABLET BY MOUTH DAILY, Disp: 30 tablet, Rfl: 5  •  losartan (COZAAR) 100 MG tablet, Take 1 tablet by mouth Daily., Disp: 90 tablet, Rfl: 1  •  omeprazole (priLOSEC) 20 MG capsule, TAKE 1 CAPSULE BY MOUTH DAILY, Disp: 30 capsule, Rfl: 5  Allergies: Patient has no known allergies.    Physical Exam  Constitutional:       Appearance: He is obese.   HENT:      Right Ear: Tympanic membrane normal.      Left Ear: Tympanic membrane normal.      Mouth/Throat:      Mouth: Mucous membranes are moist.   Eyes:      Conjunctiva/sclera: Conjunctivae normal.      Pupils: Pupils are equal, round, and reactive to light.   Cardiovascular:      Rate and Rhythm: Normal rate and regular rhythm.      Heart sounds: Normal heart sounds.   Pulmonary:      Effort: Pulmonary effort is normal.      Breath sounds: Normal breath sounds.   Neurological:      Mental Status: He is oriented to person, place, and time.   Psychiatric:         Mood and Affect: Mood normal.         Behavior: Behavior normal.             Assessment and Plan   Diagnoses and all orders for this visit:    1. Hypertension, essential (Primary)  -     Comprehensive Metabolic Panel; Future  -     CBC & Differential; Future  -     TSH; Future  -     Comprehensive Metabolic Panel  -     CBC & Differential  -     TSH  Will refill medication. Encouraged healthy diet. Patient encouraged to f/up with cardiology and sleep specialist . RTC prior to recheck as needed.      -     losartan (COZAAR) 100 MG tablet; Take 1 tablet by mouth Daily.  Dispense: 90 tablet; Refill: 1    2. Encounter for immunization    3. Morbid obesity with BMI of 60.0-69.9, adult (HCC)  4. Gastroesophageal reflux disease, unspecified whether esophagitis present  Continue omeprazole for reflux symptoms- states continues to work well.  Other orders  -     FluLaval/Fluarix/Fluzone >6 Months            Follow Up   No follow-ups on file.  Patient was given instructions and counseling regarding his condition or for health maintenance advice. Please see specific information pulled into the AVS if appropriate.     Ronit Mitchell PA-C

## 2022-10-15 LAB
ALBUMIN SERPL-MCNC: 4.2 G/DL (ref 4–5)
ALBUMIN/GLOB SERPL: 1.4 {RATIO} (ref 1.2–2.2)
ALP SERPL-CCNC: 82 IU/L (ref 44–121)
ALT SERPL-CCNC: 19 IU/L (ref 0–44)
AST SERPL-CCNC: 20 IU/L (ref 0–40)
BASOPHILS # BLD AUTO: 0.1 X10E3/UL (ref 0–0.2)
BASOPHILS NFR BLD AUTO: 1 %
BILIRUB SERPL-MCNC: 0.4 MG/DL (ref 0–1.2)
BUN SERPL-MCNC: 21 MG/DL (ref 6–24)
BUN/CREAT SERPL: 17 (ref 9–20)
CALCIUM SERPL-MCNC: 8.9 MG/DL (ref 8.7–10.2)
CHLORIDE SERPL-SCNC: 101 MMOL/L (ref 96–106)
CO2 SERPL-SCNC: 25 MMOL/L (ref 20–29)
CREAT SERPL-MCNC: 1.27 MG/DL (ref 0.76–1.27)
EGFRCR SERPLBLD CKD-EPI 2021: 69 ML/MIN/1.73
EOSINOPHIL # BLD AUTO: 0.4 X10E3/UL (ref 0–0.4)
EOSINOPHIL NFR BLD AUTO: 5 %
ERYTHROCYTE [DISTWIDTH] IN BLOOD BY AUTOMATED COUNT: 13.7 % (ref 11.6–15.4)
GLOBULIN SER CALC-MCNC: 3 G/DL (ref 1.5–4.5)
GLUCOSE SERPL-MCNC: NORMAL MG/DL
HCT VFR BLD AUTO: 48.3 % (ref 37.5–51)
HGB BLD-MCNC: 15.6 G/DL (ref 13–17.7)
IMM GRANULOCYTES # BLD AUTO: 0 X10E3/UL (ref 0–0.1)
IMM GRANULOCYTES NFR BLD AUTO: 0 %
LYMPHOCYTES # BLD AUTO: 1.9 X10E3/UL (ref 0.7–3.1)
LYMPHOCYTES NFR BLD AUTO: 26 %
MCH RBC QN AUTO: 27.5 PG (ref 26.6–33)
MCHC RBC AUTO-ENTMCNC: 32.3 G/DL (ref 31.5–35.7)
MCV RBC AUTO: 85 FL (ref 79–97)
MONOCYTES # BLD AUTO: 0.4 X10E3/UL (ref 0.1–0.9)
MONOCYTES NFR BLD AUTO: 6 %
NEUTROPHILS # BLD AUTO: 4.4 X10E3/UL (ref 1.4–7)
NEUTROPHILS NFR BLD AUTO: 62 %
PLATELET # BLD AUTO: 249 X10E3/UL (ref 150–450)
POTASSIUM SERPL-SCNC: NORMAL MMOL/L
PROT SERPL-MCNC: 7.2 G/DL (ref 6–8.5)
RBC # BLD AUTO: 5.67 X10E6/UL (ref 4.14–5.8)
SODIUM SERPL-SCNC: 142 MMOL/L (ref 134–144)
TSH SERPL DL<=0.005 MIU/L-ACNC: 1.28 UIU/ML (ref 0.45–4.5)
WBC # BLD AUTO: 7.1 X10E3/UL (ref 3.4–10.8)

## 2022-10-17 DIAGNOSIS — I10 ESSENTIAL HYPERTENSION: Primary | ICD-10-CM

## 2022-12-09 NOTE — TELEPHONE ENCOUNTER
Caller: Catracho Sahu    Relationship: Self    Best call back number: [phone]    Caller requesting test results: SELF    What test was performed: XRAY     When was the test performed: 3/10/21    Where was the test performed:     Additional notes:            Ortho Resident

## 2023-02-16 ENCOUNTER — OFFICE VISIT (OUTPATIENT)
Dept: FAMILY MEDICINE CLINIC | Facility: CLINIC | Age: 53
End: 2023-02-16
Payer: COMMERCIAL

## 2023-02-16 VITALS
HEIGHT: 73 IN | WEIGHT: 315 LBS | TEMPERATURE: 98.8 F | HEART RATE: 80 BPM | SYSTOLIC BLOOD PRESSURE: 150 MMHG | DIASTOLIC BLOOD PRESSURE: 88 MMHG | BODY MASS INDEX: 41.75 KG/M2 | OXYGEN SATURATION: 95 %

## 2023-02-16 DIAGNOSIS — R05.9 COUGH, UNSPECIFIED TYPE: ICD-10-CM

## 2023-02-16 DIAGNOSIS — J06.9 VIRAL UPPER RESPIRATORY TRACT INFECTION: Primary | ICD-10-CM

## 2023-02-16 LAB
EXPIRATION DATE: NORMAL
FLUAV AG UPPER RESP QL IA.RAPID: NOT DETECTED
FLUBV AG UPPER RESP QL IA.RAPID: NOT DETECTED
INTERNAL CONTROL: NORMAL
Lab: NORMAL
SARS-COV-2 AG UPPER RESP QL IA.RAPID: NOT DETECTED

## 2023-02-16 PROCEDURE — 99213 OFFICE O/P EST LOW 20 MIN: CPT | Performed by: PHYSICIAN ASSISTANT

## 2023-02-16 PROCEDURE — 87428 SARSCOV & INF VIR A&B AG IA: CPT | Performed by: PHYSICIAN ASSISTANT

## 2023-02-16 NOTE — PROGRESS NOTES
"Chief Complaint  cough (Coughing, sob, diarrhea and nausea started Tuesday night )    Subjective          Catracho Sahu presents to [facility]  History of Present Illness  Patient in today for evaluation on cough that started 2 days ago. States symptoms started with vomiting and diarrhea 2 days ago with mild nasal congestion and mild cough. Vomiting and diarrhea has stopped. Felt like was running fever at that time as well. States cough is dry and at times feels like can't take deep breath like  normal. Denies any chest pain .       Objective   Vital Signs:   /88 (BP Location: Left arm, Patient Position: Sitting, Cuff Size: Large Adult)   Pulse 80   Temp 98.8 °F (37.1 °C)   Ht 185.4 cm (73\")   Wt (!) 241 kg (531 lb)   SpO2 95%   BMI 70.06 kg/m²     Body mass index is 70.06 kg/m².    Review of Systems   Constitutional: Positive for chills and fever.   HENT: Positive for congestion. Negative for sore throat.    Respiratory: Positive for cough.    Cardiovascular: Negative for chest pain.   Gastrointestinal: Positive for diarrhea and vomiting. Negative for abdominal pain and blood in stool.       Past History:  Medical History: has a past medical history of Fractures, GERD (gastroesophageal reflux disease), Hypertension, and Sleep apnea.   Surgical History: has a past surgical history that includes Colonoscopy (2008); Tonsillectomy (2002); and Uvulopalatopharyngoplasty.   Family History: family history includes Cancer in his maternal grandmother; Dementia in his paternal grandfather; Heart attack in his paternal grandmother; Hypertension in his father, paternal aunt, and paternal grandmother; Migraines in his mother; Parkinsonism in his maternal grandfather; Stroke in his father and paternal grandfather.   Social History: reports that he has never smoked. He has never used smokeless tobacco. He reports that he does not drink alcohol and does not use drugs.      Current " Outpatient Medications:   •  hydroCHLOROthiazide (HYDRODIURIL) 25 MG tablet, TAKE 1 TABLET BY MOUTH DAILY, Disp: 30 tablet, Rfl: 5  •  losartan (COZAAR) 100 MG tablet, Take 1 tablet by mouth Daily., Disp: 90 tablet, Rfl: 1  •  omeprazole (priLOSEC) 20 MG capsule, TAKE 1 CAPSULE BY MOUTH DAILY, Disp: 30 capsule, Rfl: 5  Allergies: Patient has no known allergies.    Physical Exam  Constitutional:       Appearance: He is obese.   HENT:      Right Ear: Tympanic membrane normal.      Left Ear: Tympanic membrane normal.      Mouth/Throat:      Mouth: Mucous membranes are moist.   Eyes:      Pupils: Pupils are equal, round, and reactive to light.   Cardiovascular:      Rate and Rhythm: Normal rate and regular rhythm.      Heart sounds: Normal heart sounds.   Pulmonary:      Effort: Pulmonary effort is normal.      Breath sounds: Normal breath sounds.             Assessment and Plan   Diagnoses and all orders for this visit:    1. Viral upper respiratory tract infection (Primary)  Rapid flu and covid testing negative. Recommend symptom management at this time.Encouraged good hydration and rest.  However discussed if any progression or worsening of symptoms to ER for acute evaluation .Recommend to monitor bp since up a bit today as well.    2. Cough, unspecified type  -     Covid-19 + Flu A&B AG, Veritor            Follow Up   No follow-ups on file.  Patient was given instructions and counseling regarding his condition or for health maintenance advice. Please see specific information pulled into the AVS if appropriate.     Ronit Mitchell PA-C

## 2023-03-17 ENCOUNTER — TELEPHONE (OUTPATIENT)
Dept: PEDIATRICS | Facility: OTHER | Age: 53
End: 2023-03-17

## 2023-03-22 ENCOUNTER — TELEPHONE (OUTPATIENT)
Dept: FAMILY MEDICINE CLINIC | Facility: CLINIC | Age: 53
End: 2023-03-22
Payer: COMMERCIAL

## 2023-03-22 DIAGNOSIS — I10 ESSENTIAL HYPERTENSION, BENIGN: ICD-10-CM

## 2023-03-22 DIAGNOSIS — R60.9 PERIPHERAL EDEMA: ICD-10-CM

## 2023-03-22 RX ORDER — OMEPRAZOLE 20 MG/1
20 CAPSULE, DELAYED RELEASE ORAL DAILY
Qty: 30 CAPSULE | Refills: 5 | OUTPATIENT
Start: 2023-03-22

## 2023-03-22 RX ORDER — DICLOFENAC SODIUM 75 MG/1
TABLET, DELAYED RELEASE ORAL
Qty: 60 TABLET | Refills: 0 | OUTPATIENT
Start: 2023-03-22

## 2023-03-22 RX ORDER — LOSARTAN POTASSIUM 100 MG/1
100 TABLET ORAL DAILY
Qty: 90 TABLET | Refills: 1 | OUTPATIENT
Start: 2023-03-22

## 2023-03-22 RX ORDER — HYDROCHLOROTHIAZIDE 25 MG/1
25 TABLET ORAL DAILY
Qty: 30 TABLET | Refills: 5 | OUTPATIENT
Start: 2023-03-22

## 2023-03-22 NOTE — TELEPHONE ENCOUNTER
"Called pt LVM for pt to call back  HUB TO READ  \"LVM for pt to call back looks like another provider does omeprazole and hydrochlorothiazide. The losartan was sent should last until 4/14 and diclofenac was a short term script. Needs an appt for refills.\"  "

## 2023-03-22 NOTE — TELEPHONE ENCOUNTER
Caller: Catracho Sahu    Relationship: Self    Best call back number: [phone]    Requested Prescriptions:   Requested Prescriptions      No prescriptions requested or ordered in this encounter      hydroCHLOROthiazide (HYDRODIURIL) 25 MG tablet  losartan (COZAAR) 100 MG tablet  omeprazole (priLOSEC) 20 MG capsule  Diclofenac Sodium 75 MG     [facility] where [facility]     Last office visit with prescribing clinician: 2/16/2023   Last telemedicine visit with prescribing clinician: Visit date not found   Next office visit with prescribing clinician: Visit date not found     Additional details provided by patient PHARMACY TOLD THE PATIENT THE REFILLS WERE NOT APPROVED   PLEASE CALL PATIENT     Does the patient have less than a 3 day supply:  [x] Yes  [] No    Would you like a call back once the refill request has been completed: [] Yes [x] No    If the office needs to give you a call back, can they leave a voicemail: [] Yes [x] No

## 2023-03-23 ENCOUNTER — TELEPHONE (OUTPATIENT)
Dept: FAMILY MEDICINE CLINIC | Facility: CLINIC | Age: 53
End: 2023-03-23
Payer: COMMERCIAL

## 2023-03-23 ENCOUNTER — TELEPHONE (OUTPATIENT)
Dept: INTERNAL MEDICINE | Facility: CLINIC | Age: 53
End: 2023-03-23
Payer: COMMERCIAL

## 2023-03-23 NOTE — TELEPHONE ENCOUNTER
PT CALLED THE OFFICE AND STATED HIS NEW PROVIDER IS NOT ABLE TO CALL IN HIS OMEPRAZOLE OR  HYDROCHLOROTHIAZIDE B/C  CALLED IT IN FOR HIM. THE PT IS COMPLETELY OUT AND NEEDS A REFILL AND SAID HE DOESN'T KNOW WHAT TO DO NOW, PLEASE ADVISE.

## 2023-03-23 NOTE — TELEPHONE ENCOUNTER
Spoke w Dr Rose office and they said they will call pt to fix the problem.   .  .      lmom for Dr Mitchell office to call me

## 2023-03-23 NOTE — TELEPHONE ENCOUNTER
Caller: DALJIT NURSE OF DR ELDON RYAN     Best call back number: [phone]    What was the call regarding:   DALJIT PORTER NURSE OF DR ELDON RYAN WOULD LIKE A CALL BACK REGARDING NEEDING CLARIFICATION ON PATIENT'S MEDICATION'S     Do you require a callback: YES

## 2023-03-23 NOTE — TELEPHONE ENCOUNTER
Called office explained we was not telling pt to call their office just told pt he needed an appt. Called pharmacy they said last script pt picked up on hydrochlorothiazide was 9/2/22 for 30 and omeprazole was picked up 9/2/22 for 30.

## 2023-03-23 NOTE — TELEPHONE ENCOUNTER
Called pt to schedule he said he will contact old provider to see if they will fill told pt if we schedule him an appt I could send a message to May to see if she can fill until then. Pt said he will call back to let us know.

## 2023-03-23 NOTE — TELEPHONE ENCOUNTER
"Kassidy Rivas MA          4:13 PM  Note    Called pt LVM for pt to call back  HUB TO READ  \"LVM for pt to call back looks like another provider does omeprazole and hydrochlorothiazide. The losartan was sent should last until 4/14 and diclofenac was a short term script. Needs an appt for refills.\"        PATIENT INFORMED OF HUB TO READ, STATES NO LONGER SEES THAT DOCTOR, AND IS OUT OF MEDICATION, PLEASE ADVISE, HE UNDERSTAND THE MESSSAGE  "

## 2023-03-30 ENCOUNTER — APPOINTMENT (OUTPATIENT)
Dept: CARDIOLOGY | Facility: HOSPITAL | Age: 53
End: 2023-03-30
Payer: COMMERCIAL

## 2023-03-30 ENCOUNTER — HOSPITAL ENCOUNTER (EMERGENCY)
Facility: HOSPITAL | Age: 53
Discharge: HOME | End: 2023-03-30
Attending: EMERGENCY MEDICINE | Admitting: EMERGENCY MEDICINE
Payer: COMMERCIAL

## 2023-03-30 ENCOUNTER — TELEPHONE (OUTPATIENT)
Dept: FAMILY MEDICINE CLINIC | Facility: CLINIC | Age: 53
End: 2023-03-30

## 2023-03-30 ENCOUNTER — OFFICE VISIT (OUTPATIENT)
Dept: FAMILY MEDICINE CLINIC | Facility: CLINIC | Age: 53
End: 2023-03-30
Payer: COMMERCIAL

## 2023-03-30 ENCOUNTER — APPOINTMENT (OUTPATIENT)
Dept: CT IMAGING | Facility: HOSPITAL | Age: 53
End: 2023-03-30
Payer: COMMERCIAL

## 2023-03-30 ENCOUNTER — APPOINTMENT (OUTPATIENT)
Dept: GENERAL RADIOLOGY | Facility: HOSPITAL | Age: 53
End: 2023-03-30
Payer: COMMERCIAL

## 2023-03-30 VITALS
TEMPERATURE: 98 F | WEIGHT: 315 LBS | DIASTOLIC BLOOD PRESSURE: 74 MMHG | RESPIRATION RATE: 20 BRPM | SYSTOLIC BLOOD PRESSURE: 135 MMHG | HEIGHT: 71 IN | OXYGEN SATURATION: 91 % | BODY MASS INDEX: 44.1 KG/M2 | HEART RATE: 79 BPM

## 2023-03-30 VITALS
DIASTOLIC BLOOD PRESSURE: 110 MMHG | WEIGHT: 315 LBS | SYSTOLIC BLOOD PRESSURE: 168 MMHG | OXYGEN SATURATION: 93 % | HEART RATE: 97 BPM | HEIGHT: 73 IN | BODY MASS INDEX: 41.75 KG/M2

## 2023-03-30 DIAGNOSIS — I10 ESSENTIAL HYPERTENSION, BENIGN: Primary | ICD-10-CM

## 2023-03-30 DIAGNOSIS — Z12.11 COLON CANCER SCREENING: ICD-10-CM

## 2023-03-30 DIAGNOSIS — Z11.59 ENCOUNTER FOR HEPATITIS C SCREENING TEST FOR LOW RISK PATIENT: ICD-10-CM

## 2023-03-30 DIAGNOSIS — Z13.1 DIABETES MELLITUS SCREENING: ICD-10-CM

## 2023-03-30 DIAGNOSIS — E66.9 OBESITY, UNSPECIFIED CLASSIFICATION, UNSPECIFIED OBESITY TYPE, UNSPECIFIED WHETHER SERIOUS COMORBIDITY PRESENT: ICD-10-CM

## 2023-03-30 DIAGNOSIS — Z13.220 LIPID SCREENING: ICD-10-CM

## 2023-03-30 DIAGNOSIS — Z12.5 PROSTATE CANCER SCREENING: ICD-10-CM

## 2023-03-30 DIAGNOSIS — L03.115 CELLULITIS OF RIGHT LEG: ICD-10-CM

## 2023-03-30 DIAGNOSIS — R60.9 PERIPHERAL EDEMA: ICD-10-CM

## 2023-03-30 DIAGNOSIS — R60.0 BILATERAL EDEMA OF LOWER EXTREMITY: Primary | ICD-10-CM

## 2023-03-30 DIAGNOSIS — I10 HYPERTENSION, UNSPECIFIED TYPE: ICD-10-CM

## 2023-03-30 DIAGNOSIS — R06.02 SOB (SHORTNESS OF BREATH) ON EXERTION: ICD-10-CM

## 2023-03-30 LAB
ALBUMIN SERPL-MCNC: 3.8 G/DL (ref 3.5–5.2)
ALBUMIN/GLOB SERPL: 1.2 G/DL
ALP SERPL-CCNC: 77 U/L (ref 39–117)
ALT SERPL W P-5'-P-CCNC: 13 U/L (ref 1–41)
ANION GAP SERPL CALCULATED.3IONS-SCNC: 8 MMOL/L (ref 5–15)
AST SERPL-CCNC: 17 U/L (ref 1–40)
BASOPHILS # BLD AUTO: 0.07 10*3/MM3 (ref 0–0.2)
BASOPHILS NFR BLD AUTO: 1.1 % (ref 0–1.5)
BH CV LOWER VASCULAR LEFT COMMON FEMORAL AUGMENT: NORMAL
BH CV LOWER VASCULAR LEFT COMMON FEMORAL COMPRESS: NORMAL
BH CV LOWER VASCULAR LEFT COMMON FEMORAL PHASIC: NORMAL
BH CV LOWER VASCULAR LEFT DISTAL FEMORAL AUGMENT: NORMAL
BH CV LOWER VASCULAR LEFT DISTAL FEMORAL COMPRESS: NORMAL
BH CV LOWER VASCULAR LEFT DISTAL FEMORAL PHASIC: NORMAL
BH CV LOWER VASCULAR LEFT GASTRONEMIUS COMPRESS: NORMAL
BH CV LOWER VASCULAR LEFT GREATER SAPH AK COMPRESS: NORMAL
BH CV LOWER VASCULAR LEFT GREATER SAPH BK COMPRESS: NORMAL
BH CV LOWER VASCULAR LEFT LESSER SAPH COMPRESS: NORMAL
BH CV LOWER VASCULAR LEFT MID FEMORAL AUGMENT: NORMAL
BH CV LOWER VASCULAR LEFT MID FEMORAL COMPRESS: NORMAL
BH CV LOWER VASCULAR LEFT MID FEMORAL PHASIC: NORMAL
BH CV LOWER VASCULAR LEFT PERONEAL COMPRESS: NORMAL
BH CV LOWER VASCULAR LEFT POPLITEAL AUGMENT: NORMAL
BH CV LOWER VASCULAR LEFT POPLITEAL COMPRESS: NORMAL
BH CV LOWER VASCULAR LEFT POPLITEAL PHASIC: NORMAL
BH CV LOWER VASCULAR LEFT POSTERIOR TIBIAL COMPRESS: NORMAL
BH CV LOWER VASCULAR LEFT PROFUNDA FEMORAL AUGMENT: NORMAL
BH CV LOWER VASCULAR LEFT PROFUNDA FEMORAL COMPRESS: NORMAL
BH CV LOWER VASCULAR LEFT PROFUNDA FEMORAL PHASIC: NORMAL
BH CV LOWER VASCULAR LEFT PROXIMAL FEMORAL AUGMENT: NORMAL
BH CV LOWER VASCULAR LEFT PROXIMAL FEMORAL COMPRESS: NORMAL
BH CV LOWER VASCULAR LEFT PROXIMAL FEMORAL PHASIC: NORMAL
BH CV LOWER VASCULAR LEFT SAPHENOFEMORAL JUNCTION AUGMENT: NORMAL
BH CV LOWER VASCULAR LEFT SAPHENOFEMORAL JUNCTION COMPRESS: NORMAL
BH CV LOWER VASCULAR LEFT SAPHENOFEMORAL JUNCTION PHASIC: NORMAL
BH CV LOWER VASCULAR RIGHT COMMON FEMORAL AUGMENT: NORMAL
BH CV LOWER VASCULAR RIGHT COMMON FEMORAL COMPRESS: NORMAL
BH CV LOWER VASCULAR RIGHT COMMON FEMORAL PHASIC: NORMAL
BH CV LOWER VASCULAR RIGHT DISTAL FEMORAL AUGMENT: NORMAL
BH CV LOWER VASCULAR RIGHT DISTAL FEMORAL COMPRESS: NORMAL
BH CV LOWER VASCULAR RIGHT DISTAL FEMORAL PHASIC: NORMAL
BH CV LOWER VASCULAR RIGHT GASTRONEMIUS COMPRESS: NORMAL
BH CV LOWER VASCULAR RIGHT GREATER SAPH AK COMPRESS: NORMAL
BH CV LOWER VASCULAR RIGHT GREATER SAPH BK COMPRESS: NORMAL
BH CV LOWER VASCULAR RIGHT LESSER SAPH COMPRESS: NORMAL
BH CV LOWER VASCULAR RIGHT MID FEMORAL AUGMENT: NORMAL
BH CV LOWER VASCULAR RIGHT MID FEMORAL COMPRESS: NORMAL
BH CV LOWER VASCULAR RIGHT MID FEMORAL PHASIC: NORMAL
BH CV LOWER VASCULAR RIGHT PERONEAL COMPRESS: NORMAL
BH CV LOWER VASCULAR RIGHT POPLITEAL AUGMENT: NORMAL
BH CV LOWER VASCULAR RIGHT POPLITEAL COMPRESS: NORMAL
BH CV LOWER VASCULAR RIGHT POPLITEAL PHASIC: NORMAL
BH CV LOWER VASCULAR RIGHT POSTERIOR TIBIAL COMPRESS: NORMAL
BH CV LOWER VASCULAR RIGHT PROFUNDA FEMORAL AUGMENT: NORMAL
BH CV LOWER VASCULAR RIGHT PROFUNDA FEMORAL COMPRESS: NORMAL
BH CV LOWER VASCULAR RIGHT PROFUNDA FEMORAL PHASIC: NORMAL
BH CV LOWER VASCULAR RIGHT PROXIMAL FEMORAL AUGMENT: NORMAL
BH CV LOWER VASCULAR RIGHT PROXIMAL FEMORAL COMPRESS: NORMAL
BH CV LOWER VASCULAR RIGHT PROXIMAL FEMORAL PHASIC: NORMAL
BH CV LOWER VASCULAR RIGHT SAPHENOFEMORAL JUNCTION AUGMENT: NORMAL
BH CV LOWER VASCULAR RIGHT SAPHENOFEMORAL JUNCTION COMPRESS: NORMAL
BH CV LOWER VASCULAR RIGHT SAPHENOFEMORAL JUNCTION PHASIC: NORMAL
BH CV VAS PRELIMINARY FINDINGS SCRIPTING: 1
BILIRUB SERPL-MCNC: 0.6 MG/DL (ref 0–1.2)
BUN SERPL-MCNC: 14 MG/DL (ref 6–20)
BUN/CREAT SERPL: 11.5 (ref 7–25)
CALCIUM SPEC-SCNC: 8.7 MG/DL (ref 8.6–10.5)
CHLORIDE SERPL-SCNC: 105 MMOL/L (ref 98–107)
CO2 SERPL-SCNC: 27 MMOL/L (ref 22–29)
CREAT BLDA-MCNC: 1.5 MG/DL
CREAT BLDA-MCNC: 1.5 MG/DL (ref 0.6–1.3)
CREAT SERPL-MCNC: 1.22 MG/DL (ref 0.76–1.27)
DEPRECATED RDW RBC AUTO: 44.4 FL (ref 37–54)
EGFRCR SERPLBLD CKD-EPI 2021: 72.2 ML/MIN/1.73
EOSINOPHIL # BLD AUTO: 0.25 10*3/MM3 (ref 0–0.4)
EOSINOPHIL NFR BLD AUTO: 3.8 % (ref 0.3–6.2)
ERYTHROCYTE [DISTWIDTH] IN BLOOD BY AUTOMATED COUNT: 14 % (ref 12.3–15.4)
GLOBULIN UR ELPH-MCNC: 3.2 GM/DL
GLUCOSE SERPL-MCNC: 95 MG/DL (ref 65–99)
HCT VFR BLD AUTO: 46.9 % (ref 37.5–51)
HGB BLD-MCNC: 15.2 G/DL (ref 13–17.7)
HOLD SPECIMEN: NORMAL
IMM GRANULOCYTES # BLD AUTO: 0.01 10*3/MM3 (ref 0–0.05)
IMM GRANULOCYTES NFR BLD AUTO: 0.2 % (ref 0–0.5)
LYMPHOCYTES # BLD AUTO: 1.7 10*3/MM3 (ref 0.7–3.1)
LYMPHOCYTES NFR BLD AUTO: 25.8 % (ref 19.6–45.3)
MAXIMAL PREDICTED HEART RATE: 170 BPM
MCH RBC QN AUTO: 28.3 PG (ref 26.6–33)
MCHC RBC AUTO-ENTMCNC: 32.4 G/DL (ref 31.5–35.7)
MCV RBC AUTO: 87.2 FL (ref 79–97)
MONOCYTES # BLD AUTO: 0.44 10*3/MM3 (ref 0.1–0.9)
MONOCYTES NFR BLD AUTO: 6.7 % (ref 5–12)
NEUTROPHILS NFR BLD AUTO: 4.13 10*3/MM3 (ref 1.7–7)
NEUTROPHILS NFR BLD AUTO: 62.4 % (ref 42.7–76)
NRBC BLD AUTO-RTO: 0 /100 WBC (ref 0–0.2)
NT-PROBNP SERPL-MCNC: 48.7 PG/ML (ref 0–900)
PLATELET # BLD AUTO: 209 10*3/MM3 (ref 140–450)
PMV BLD AUTO: 9.8 FL (ref 6–12)
POTASSIUM SERPL-SCNC: 4.3 MMOL/L (ref 3.5–5.2)
PROT SERPL-MCNC: 7 G/DL (ref 6–8.5)
QT INTERVAL: 384 MS
QTC INTERVAL: 448 MS
RBC # BLD AUTO: 5.38 10*6/MM3 (ref 4.14–5.8)
SODIUM SERPL-SCNC: 140 MMOL/L (ref 136–145)
STRESS TARGET HR: 145 BPM
TROPONIN T SERPL HS-MCNC: 10 NG/L
WBC NRBC COR # BLD: 6.6 10*3/MM3 (ref 3.4–10.8)
WHOLE BLOOD HOLD COAG: NORMAL
WHOLE BLOOD HOLD SPECIMEN: NORMAL

## 2023-03-30 PROCEDURE — 99284 EMERGENCY DEPT VISIT MOD MDM: CPT

## 2023-03-30 PROCEDURE — 82565 ASSAY OF CREATININE: CPT

## 2023-03-30 PROCEDURE — 71275 CT ANGIOGRAPHY CHEST: CPT

## 2023-03-30 PROCEDURE — 84484 ASSAY OF TROPONIN QUANT: CPT | Performed by: PHYSICIAN ASSISTANT

## 2023-03-30 PROCEDURE — 80053 COMPREHEN METABOLIC PANEL: CPT | Performed by: PHYSICIAN ASSISTANT

## 2023-03-30 PROCEDURE — 85025 COMPLETE CBC W/AUTO DIFF WBC: CPT | Performed by: PHYSICIAN ASSISTANT

## 2023-03-30 PROCEDURE — 93005 ELECTROCARDIOGRAM TRACING: CPT | Performed by: PHYSICIAN ASSISTANT

## 2023-03-30 PROCEDURE — 93970 EXTREMITY STUDY: CPT

## 2023-03-30 PROCEDURE — 93970 EXTREMITY STUDY: CPT | Performed by: INTERNAL MEDICINE

## 2023-03-30 PROCEDURE — 25510000001 IOPAMIDOL PER 1 ML: Performed by: EMERGENCY MEDICINE

## 2023-03-30 PROCEDURE — 99214 OFFICE O/P EST MOD 30 MIN: CPT | Performed by: PHYSICIAN ASSISTANT

## 2023-03-30 PROCEDURE — 83880 ASSAY OF NATRIURETIC PEPTIDE: CPT | Performed by: PHYSICIAN ASSISTANT

## 2023-03-30 RX ORDER — HYDROCHLOROTHIAZIDE 25 MG/1
25 TABLET ORAL DAILY
Status: DISCONTINUED | OUTPATIENT
Start: 2023-03-30 | End: 2023-03-30 | Stop reason: HOSPADM

## 2023-03-30 RX ORDER — CEPHALEXIN 500 MG/1
500 CAPSULE ORAL 2 TIMES DAILY
Qty: 20 CAPSULE | Refills: 0 | Status: SHIPPED | OUTPATIENT
Start: 2023-03-30 | End: 2023-04-09

## 2023-03-30 RX ORDER — HYDROCHLOROTHIAZIDE 25 MG/1
25 TABLET ORAL DAILY
Qty: 90 TABLET | Refills: 1 | Status: SHIPPED | OUTPATIENT
Start: 2023-03-30

## 2023-03-30 RX ORDER — LOSARTAN POTASSIUM 100 MG/1
100 TABLET ORAL DAILY
Qty: 90 TABLET | Refills: 1 | Status: SHIPPED | OUTPATIENT
Start: 2023-03-30

## 2023-03-30 RX ORDER — LOSARTAN POTASSIUM 50 MG/1
100 TABLET ORAL ONCE
Status: COMPLETED | OUTPATIENT
Start: 2023-03-30 | End: 2023-03-30

## 2023-03-30 RX ORDER — FUROSEMIDE 20 MG/1
20 TABLET ORAL DAILY
Qty: 5 TABLET | Refills: 0 | Status: SHIPPED | OUTPATIENT
Start: 2023-03-30 | End: 2023-04-13 | Stop reason: ALTCHOICE

## 2023-03-30 RX ORDER — OMEPRAZOLE 20 MG/1
20 CAPSULE, DELAYED RELEASE ORAL DAILY
Qty: 90 CAPSULE | Refills: 1 | Status: SHIPPED | OUTPATIENT
Start: 2023-03-30

## 2023-03-30 RX ADMIN — IOPAMIDOL 100 ML: 755 INJECTION, SOLUTION INTRAVENOUS at 13:16

## 2023-03-30 RX ADMIN — HYDROCHLOROTHIAZIDE 25 MG: 25 TABLET ORAL at 11:12

## 2023-03-30 RX ADMIN — LOSARTAN POTASSIUM 100 MG: 50 TABLET, FILM COATED ORAL at 10:34

## 2023-03-30 NOTE — PROGRESS NOTES
"Chief Complaint  Med Refill    Subjective          Catracho Sahu presents to [facility]  History of Present Illness  Patient in today for medication recheck and refill- states had run out of hctz and omeprazole for a while - but still taking losartan. He denies any chest pain, states keeps swelling to lower legs all the time,  but states has felt quite swollen past few days to upper extremities and abdomen.  He had seen cardiology last year and was recommended to have ECHO , but he had canceled and not rescheduled. He was diagnosed with sleep apnea but was unable to wear cpap- had surgery to correct and states does not wake up as did before. He is due for fasting labs and may rtc to have those drawn. He is due for colonoscopy and would like to get scheduled.   Hypertension  This is a chronic problem. Associated symptoms include peripheral edema. Pertinent negatives include no blurred vision, chest pain, palpitations or shortness of breath.   Heartburn  He reports no abdominal pain, no chest pain, no hoarse voice, no nausea or no sore throat. Pertinent negatives include no fatigue.       Objective   Vital Signs:   BP (!) 168/110 (BP Location: Left arm, Patient Position: Sitting, Cuff Size: Large Adult)   Pulse 97   Ht 185.4 cm (73\")   Wt (!) 244 kg (538 lb)   SpO2 93%   BMI 70.98 kg/m²     Body mass index is 70.98 kg/m².    Review of Systems   Constitutional: Negative for fatigue.   HENT: Negative for congestion, hoarse voice and sore throat.    Eyes: Negative for blurred vision.   Respiratory: Negative for shortness of breath.    Cardiovascular: Negative for chest pain and palpitations.   Gastrointestinal: Negative for abdominal pain, blood in stool, diarrhea, nausea and vomiting.   Genitourinary: Negative for dysuria and frequency.   Neurological: Negative for dizziness and headache.       Past History:  Medical History: has a past medical history of Fractures, GERD " (gastroesophageal reflux disease), Hypertension, and Sleep apnea.   Surgical History: has a past surgical history that includes Colonoscopy (2008); Tonsillectomy (2002); and Uvulopalatopharyngoplasty.   Family History: family history includes Cancer in his maternal grandmother; Dementia in his paternal grandfather; Heart attack in his paternal grandmother; Hypertension in his father, paternal aunt, and paternal grandmother; Migraines in his mother; Parkinsonism in his maternal grandfather; Stroke in his father and paternal grandfather.   Social History: reports that he has never smoked. He has never used smokeless tobacco. He reports that he does not drink alcohol and does not use drugs.    No current facility-administered medications for this visit.    Current Outpatient Medications:   •  hydroCHLOROthiazide (HYDRODIURIL) 25 MG tablet, Take 1 tablet by mouth Daily., Disp: 90 tablet, Rfl: 1  •  losartan (COZAAR) 100 MG tablet, Take 1 tablet by mouth Daily., Disp: 90 tablet, Rfl: 1  •  omeprazole (priLOSEC) 20 MG capsule, Take 1 capsule by mouth Daily., Disp: 90 capsule, Rfl: 1    Facility-Administered Medications Ordered in Other Visits:   •  hydroCHLOROthiazide (HYDRODIURIL) tablet 25 mg, 25 mg, Oral, Daily, Viki Lucio PA, 25 mg at 03/30/23 1112  Allergies: Patient has no known allergies.    Physical Exam  Constitutional:       Appearance: Normal appearance. He is obese.   HENT:      Right Ear: Tympanic membrane normal.      Left Ear: Tympanic membrane normal.      Mouth/Throat:      Pharynx: Oropharynx is clear.   Eyes:      Conjunctiva/sclera: Conjunctivae normal.      Pupils: Pupils are equal, round, and reactive to light.   Cardiovascular:      Rate and Rhythm: Normal rate and regular rhythm.      Heart sounds: Normal heart sounds.   Pulmonary:      Effort: Pulmonary effort is normal.      Breath sounds: Normal breath sounds.   Abdominal:      Palpations: Abdomen is soft.      Tenderness: There is no  abdominal tenderness.   Musculoskeletal:      Right lower leg: Edema present.      Left lower leg: Edema present.   Neurological:      Mental Status: He is oriented to person, place, and time.   Psychiatric:         Mood and Affect: Mood normal.         Behavior: Behavior normal.             Assessment and Plan   Diagnoses and all orders for this visit:    1. Essential hypertension, benign (Primary)  -     losartan (COZAAR) 100 MG tablet; Take 1 tablet by mouth Daily.  Dispense: 90 tablet; Refill: 1  BP quite elevated in office; has chronic lower exremity swelling but states acutely has felt more swollen . We  are unable to get cxray in office or stat labs - would recommend ER evaluation today and then can f/up with cardiology ; he may rtc for fasting labs.   2. Peripheral edema  -     hydroCHLOROthiazide (HYDRODIURIL) 25 MG tablet; Take 1 tablet by mouth Daily.  Dispense: 90 tablet; Refill: 1  -     Ambulatory Referral to Cardiology  Refilled medication and will f/up with cardiology  3. Lipid screening  Fasting lipid panel  4. Diabetes mellitus screening    5. Prostate cancer screening  Will check psa with labs.   6. Encounter for hepatitis C screening test for low risk patient  7. Colon cancer screening  Will put in referral for GI .  Other orders  -     omeprazole (priLOSEC) 20 MG capsule; Take 1 capsule by mouth Daily.  Dispense: 90 capsule; Refill: 1            Follow Up   No follow-ups on file.  Patient was given instructions and counseling regarding his condition or for health maintenance advice. Please see specific information pulled into the AVS if appropriate.     Ronit Mitchell PA-C

## 2023-03-30 NOTE — ED PROVIDER NOTES
Subjective   History of Present Illness  Pt is a 51 yo male presenting to ED with complaints of HTN. PMHx significant HTN, GERD, TOREY and Obesity. Pt explains he went to PCP earlier today for a check up / labs and sent to ED due to BP readings 168/110. He also explains he has had increased swelling to bilateral legs and SOB with exertion for several months but worsening. He does not weigh himself regularly. He has not taken his BP meds this morning including HCTZ 25mg and Cozaar 10mg. He reports occasional dizziness for months but denies current dizziness in ED. He denies syncope, fever, cough, N/V/D, abdominal pain, urinary sx or back pain. He denies tobacco, drug or ETOH use.     History provided by:  Medical records and patient      Review of Systems   Constitutional: Positive for fatigue. Negative for chills and fever.   HENT: Negative for congestion.    Eyes: Negative for visual disturbance.   Respiratory: Positive for shortness of breath. Negative for cough.    Cardiovascular: Positive for leg swelling. Negative for chest pain and palpitations.   Gastrointestinal: Negative for abdominal pain, diarrhea, nausea and vomiting.   Genitourinary: Negative for difficulty urinating.   Musculoskeletal: Positive for arthralgias (diffuse, chronic).   Neurological: Negative for dizziness (not currently), syncope, speech difficulty, weakness, numbness and headaches.   Psychiatric/Behavioral: Negative for confusion.       Past Medical History:   Diagnosis Date   • Fractures    • GERD (gastroesophageal reflux disease)    • Hypertension    • Sleep apnea        No Known Allergies    Past Surgical History:   Procedure Laterality Date   • COLONOSCOPY  2008   • TONSILLECTOMY  2002   • UVULOPALATOPHARYNGOPLASTY         Family History   Problem Relation Age of Onset   • Migraines Mother    • Hypertension Father    • Stroke Father    • Hypertension Paternal Aunt    • Cancer Maternal Grandmother    • Parkinsonism Maternal Grandfather     • Heart attack Paternal Grandmother    • Hypertension Paternal Grandmother    • Dementia Paternal Grandfather    • Stroke Paternal Grandfather        Social History     Socioeconomic History   • Marital status:    Tobacco Use   • Smoking status: Never   • Smokeless tobacco: Never   Vaping Use   • Vaping Use: Never used   Substance and Sexual Activity   • Alcohol use: No   • Drug use: No   • Sexual activity: Defer           Objective   Physical Exam  Vitals and nursing note reviewed.   Constitutional:       Appearance: He is well-developed. He is obese.   HENT:      Head: Atraumatic.      Nose: Nose normal.   Eyes:      General: Lids are normal.      Conjunctiva/sclera: Conjunctivae normal.      Pupils: Pupils are equal, round, and reactive to light.   Cardiovascular:      Rate and Rhythm: Normal rate and regular rhythm.      Heart sounds: Normal heart sounds.   Pulmonary:      Effort: Pulmonary effort is normal.      Breath sounds: Normal breath sounds.   Abdominal:      General: There is no distension.      Palpations: Abdomen is soft.      Tenderness: There is no abdominal tenderness. There is no guarding or rebound.   Musculoskeletal:         General: No tenderness or deformity. Normal range of motion.      Cervical back: Normal range of motion and neck supple.      Right lower le+ Pitting Edema (mild erythema and warmth) present.      Left lower le+ Pitting Edema present.   Skin:     General: Skin is warm and dry.   Neurological:      Mental Status: He is alert and oriented to person, place, and time.      Sensory: No sensory deficit.   Psychiatric:         Behavior: Behavior normal.         Procedures           ED Course  ED Course as of 23   Thu Mar 30, 2023   1000 BP: 132/85 [RT]   1040 88% RA at rest - placed on 2 L.   Will obtain CTA chest to r/o PE.  [RT]   1101 Creatinine: 1.50 [RT]   1136 proBNP: 48.7 [RT]   1247 Contacted CT. They will be taking patient soon for CTA.  [RT]    1257 Prelim Doppler  negative for SVT and DVT in the veins visualized, bilaterally [RT]   1401 Patient ambulated in ED on RA and O2 stayed 93-94%. Nurse hooked back up to pulse ox on finger and O2 dropped to 88% but then changed to forehead pulse ox and stabilized at 96% on RA.  [RT]   1408 Discussed results and tx plan with patient. Will cover with Keflex for right lower leg cellulitis. Will give Lasix for several days and discussed elevating legs and compression stockings. He reports his PCP placed referral to cardiology. He will work on weight loss. He will f/u with PCP. He will return to ED if new / worse sx. [RT]      ED Course User Index  [RT] Viki Lucio, PA      Recent Results (from the past 24 hour(s))   Comprehensive Metabolic Panel    Collection Time: 03/30/23 10:38 AM    Specimen: Blood   Result Value Ref Range    Glucose 95 65 - 99 mg/dL    BUN 14 6 - 20 mg/dL    Creatinine 1.22 0.76 - 1.27 mg/dL    Sodium 140 136 - 145 mmol/L    Potassium 4.3 3.5 - 5.2 mmol/L    Chloride 105 98 - 107 mmol/L    CO2 27.0 22.0 - 29.0 mmol/L    Calcium 8.7 8.6 - 10.5 mg/dL    Total Protein 7.0 6.0 - 8.5 g/dL    Albumin 3.8 3.5 - 5.2 g/dL    ALT (SGPT) 13 1 - 41 U/L    AST (SGOT) 17 1 - 40 U/L    Alkaline Phosphatase 77 39 - 117 U/L    Total Bilirubin 0.6 0.0 - 1.2 mg/dL    Globulin 3.2 gm/dL    A/G Ratio 1.2 g/dL    BUN/Creatinine Ratio 11.5 7.0 - 25.0    Anion Gap 8.0 5.0 - 15.0 mmol/L    eGFR 72.2 >60.0 mL/min/1.73   BNP    Collection Time: 03/30/23 10:38 AM    Specimen: Blood   Result Value Ref Range    proBNP 48.7 0.0 - 900.0 pg/mL   Single High Sensitivity Troponin T    Collection Time: 03/30/23 10:38 AM    Specimen: Blood   Result Value Ref Range    HS Troponin T 10 <15 ng/L   CBC Auto Differential    Collection Time: 03/30/23 10:38 AM    Specimen: Blood   Result Value Ref Range    WBC 6.60 3.40 - 10.80 10*3/mm3    RBC 5.38 4.14 - 5.80 10*6/mm3    Hemoglobin 15.2 13.0 - 17.7 g/dL    Hematocrit 46.9 37.5 - 51.0  %    MCV 87.2 79.0 - 97.0 fL    MCH 28.3 26.6 - 33.0 pg    MCHC 32.4 31.5 - 35.7 g/dL    RDW 14.0 12.3 - 15.4 %    RDW-SD 44.4 37.0 - 54.0 fl    MPV 9.8 6.0 - 12.0 fL    Platelets 209 140 - 450 10*3/mm3    Neutrophil % 62.4 42.7 - 76.0 %    Lymphocyte % 25.8 19.6 - 45.3 %    Monocyte % 6.7 5.0 - 12.0 %    Eosinophil % 3.8 0.3 - 6.2 %    Basophil % 1.1 0.0 - 1.5 %    Immature Grans % 0.2 0.0 - 0.5 %    Neutrophils, Absolute 4.13 1.70 - 7.00 10*3/mm3    Lymphocytes, Absolute 1.70 0.70 - 3.10 10*3/mm3    Monocytes, Absolute 0.44 0.10 - 0.90 10*3/mm3    Eosinophils, Absolute 0.25 0.00 - 0.40 10*3/mm3    Basophils, Absolute 0.07 0.00 - 0.20 10*3/mm3    Immature Grans, Absolute 0.01 0.00 - 0.05 10*3/mm3    nRBC 0.0 0.0 - 0.2 /100 WBC   Green Top (Gel)    Collection Time: 03/30/23 10:38 AM   Result Value Ref Range    Extra Tube Hold for add-ons.    Lavender Top    Collection Time: 03/30/23 10:38 AM   Result Value Ref Range    Extra Tube hold for add-on    Gold Top - SST    Collection Time: 03/30/23 10:38 AM   Result Value Ref Range    Extra Tube Hold for add-ons.    Gray Top    Collection Time: 03/30/23 10:38 AM   Result Value Ref Range    Extra Tube Hold for add-ons.    Light Blue Top    Collection Time: 03/30/23 10:38 AM   Result Value Ref Range    Extra Tube Hold for add-ons.    ECG 12 Lead Dyspnea    Collection Time: 03/30/23 10:43 AM   Result Value Ref Range    QT Interval 384 ms    QTC Interval 448 ms   POC Creatinine    Collection Time: 03/30/23 10:55 AM    Specimen: Blood   Result Value Ref Range    Creatinine 1.50 (H) 0.60 - 1.30 mg/dL   POCT, Creatinine    Collection Time: 03/30/23 10:58 AM    Specimen: Blood   Result Value Ref Range    Creatinine 1.50 mg/dL   Duplex Venous Lower Extremity - Bilateral    Collection Time: 03/30/23  4:52 PM   Result Value Ref Range    Target HR (85%) 145 bpm    Max. Pred. HR (100%) 170 bpm    Right Common Femoral Phasic Y     Right Common Femoral Compress C     Right Common Femoral  Augment Y     Right Saphenofemoral Junction Phasic Y     Right Saphenofemoral Junction Compress C     Right Saphenofemoral Junction Augment Y     Right Profunda Femoral Phasic Y     Right Profunda Femoral Compress C     Right Profunda Femoral Augment Y     Right Proximal Femoral Phasic Y     Right Proximal Femoral Compress C     Right Proximal Femoral Augment Y     Right Mid Femoral Phasic Y     Right Mid Femoral Compress C     Right Mid Femoral Augment Y     Right Distal Femoral Phasic Y     Right Distal Femoral Compress C     Right Distal Femoral Augment Y     Right Popliteal Phasic Y     Right Popliteal Compress C     Right Popliteal Augment Y     Right Posterior Tibial Compress C     Right Peroneal Compress C     Right Gastronemius Compress C     Right Greater Saph AK Compress C     Right Greater Saph BK Compress C     Right Lesser Saph Compress C     Left Common Femoral Phasic Y     Left Common Femoral Compress C     Left Common Femoral Augment Y     Left Saphenofemoral Junction Phasic Y     Left Saphenofemoral Junction Compress C     Left Saphenofemoral Junction Augment Y     Left Profunda Femoral Phasic Y     Left Profunda Femoral Compress C     Left Profunda Femoral Augment Y     Left Proximal Femoral Phasic Y     Left Proximal Femoral Compress C     Left Proximal Femoral Augment Y     Left Mid Femoral Phasic Y     Left Mid Femoral Compress C     Left Mid Femoral Augment Y     Left Distal Femoral Phasic Y     Left Distal Femoral Compress C     Left Distal Femoral Augment Y     Left Popliteal Phasic Y     Left Popliteal Compress C     Left Popliteal Augment Y     Left Posterior Tibial Compress C     Left Peroneal Compress C     Left Gastronemius Compress C     Left Greater Saph AK Compress C     Left Greater Saph BK Compress C     Left Lesser Saph Compress C     BH CV VAS PRELIMINARY FINDINGS SCRIPTING 1.0      Note: In addition to lab results from this visit, the labs listed above may include labs taken at  "another facility or during a different encounter within the last 24 hours. Please correlate lab times with ED admission and discharge times for further clarification of the services performed during this visit.    CT Angiogram Chest   Final Result   Impression:   The pulmonary arteries are somewhat suboptimally opacified as above. There is no evidence of central filling defect concerning for large pulmonary embolus to the level of the lobar arteries. No evidence of acute infectious process or suspicious pulmonary    nodularity.      Electronically Signed: Nimesh Kessler     3/30/2023 1:25 PM EDT     Workstation ID: [id number]        Vitals:    03/30/23 1233 03/30/23 1245 03/30/23 1257 03/30/23 1330   BP: 139/79 136/84  135/74   Pulse:  77  79   Resp:       Temp:       SpO2:  94%  91%   Weight:   (!) 240 kg (530 lb)    Height:   180 cm (70.87\")      Medications   losartan (COZAAR) tablet 100 mg (100 mg Oral Given 3/30/23 1034)   iopamidol (ISOVUE-370) 76 % injection 100 mL (100 mL Intravenous Given 3/30/23 1316)     ECG/EMG Results (last 24 hours)     Procedure Component Value Units Date/Time    ECG 12 Lead Dyspnea [[id number]] Collected: 03/30/23 1043     Updated: 03/30/23 1043     QT Interval 384 ms      QTC Interval 448 ms     Narrative:      Test Reason : Dyspnea  Blood Pressure :   */*   mmHG  Vent. Rate :  82 BPM     Atrial Rate :  82 BPM     P-R Int : 176 ms          QRS Dur :  98 ms      QT Int : 384 ms       P-R-T Axes :  35  -8  40 degrees     QTc Int : 448 ms    Normal sinus rhythm  Normal ECG  When compared with ECG of 11-FEB-2021 17:51,  T wave inversion no longer evident in Inferior leads    Referred By: EDMD           Confirmed By:         ECG 12 Lead Dyspnea   Final Result   Test Reason : Dyspnea   Blood Pressure :   */*   mmHG   Vent. Rate :  82 BPM     Atrial Rate :  82 BPM      P-R Int : 176 ms          QRS Dur :  98 ms       QT Int : 384 ms       P-R-T Axes :  35  -8  40 degrees      QTc Int : 448 ms "      Normal sinus rhythm   Normal ECG   Confirmed by JENNI BAUTISTA MD ([id number]) on 3/30/2023 7:05:07 PM      Referred By: EDMD           Confirmed By: JENNI BAUTISTA MD                DISCHARGE    Patient discharged in stable condition.    Reviewed implications of results, diagnosis, meds, responsibility to follow up, warning signs and symptoms of possible worsening, potential complications and reasons to return to ER.    Patient/Family voiced understanding of above instructions.    Discussed plan for discharge, as there is no emergent indication for admission.  Pt/family is agreeable and understands need for follow up and possible repeat testing.  Pt/family is aware that discharge does not mean that nothing is wrong but that it indicates no emergency is currently present that requires admission and they must continue care with follow-up as given below or with a physician of their choice.     FOLLOW-UP  Ronit Mitchell PA-C  [address] [place], [place] [id number] [phone]    Schedule an appointment as soon as possible for a visit       [facility] Emergency Department  [address] [facility] [address] [phone]    If symptoms worsen         Medication List      New Prescriptions    cephalexin 500 MG capsule  Commonly known as: KEFLEX  Take 1 capsule by mouth 2 (Two) Times a Day for 10 days.     furosemide 20 MG tablet  Commonly known as: LASIX  Take 1 tablet by mouth Daily for 5 days.           Where to Get Your Medications      These medications were sent to [id number] [place], [place] - [address] [place] [address] AT [facility] & [address] - [phone]  - [phone]   [address] [facility] [address]    Phone: [phone]   · cephalexin 500 MG capsule  · furosemide 20 MG tablet                                         Medical Decision Making  Pt is a 51 yo male presenting to ED with complaints of elevated blood pressure and SOB  on exertion with leg swelling. Doppler negative for DVT. Labs without acute emergent findings. CTA chest without PE or pneumonia. Initially O2 dropped to mid high 80's on RA but when changed to different sensor on forehead stabilized on RA in mid 90's. His O2 on ambulation was mid 90's. Discussed tx plan with patient including adding Lasix and Keflex for mild right lower leg cellulitis. Discussed leg elevation, decreased sodium intake, compression stockings and close f/u with PCP / Cardiology. He will return to ED if new / worse sx.     DDx  CHF, ACS, PE, DVT, Cellulitis, Dependent edema, Renal failure, Pneumonia    Discussed patient with Dr. Birch who is agreeable with ED course and tx plan.     Bilateral edema of lower extremity: acute illness or injury  Cellulitis of right leg: acute illness or injury  Hypertension, unspecified type: acute illness or injury  Obesity, unspecified classification, unspecified obesity type, unspecified whether serious comorbidity present: acute illness or injury  SOB (shortness of breath) on exertion: acute illness or injury  Amount and/or Complexity of Data Reviewed  External Data Reviewed: labs, radiology and notes.     Details: PCP  Labs: ordered. Decision-making details documented in ED Course.  Radiology: ordered. Decision-making details documented in ED Course.  ECG/medicine tests: ordered. Decision-making details documented in ED Course.      Risk  Prescription drug management.          Final diagnoses:   Bilateral edema of lower extremity   SOB (shortness of breath) on exertion   Hypertension, unspecified type   Cellulitis of right leg   Obesity, unspecified classification, unspecified obesity type, unspecified whether serious comorbidity present       ED Disposition  ED Disposition     ED Disposition   Discharge    Condition   Stable    Comment   --             Ronit Mitchell, LORETTA  [address] [facility] [address]  [phone]    Schedule an appointment as  soon as possible for a visit       [facility] Emergency Department  [address] [facility] [address] [phone]    If symptoms worsen         Medication List      New Prescriptions    cephalexin 500 MG capsule  Commonly known as: KEFLEX  Take 1 capsule by mouth 2 (Two) Times a Day for 10 days.     furosemide 20 MG tablet  Commonly known as: LASIX  Take 1 tablet by mouth Daily for 5 days.           Where to Get Your Medications      These medications were sent to [id number] - [address] AT [facility] & [address] [facility] - [phone]  - [phone] [address] [address]    Phone: [phone]   · cephalexin 500 MG capsule  · furosemide 20 MG tablet          Viki Lucio PA  03/30/23 1936

## 2023-03-30 NOTE — TELEPHONE ENCOUNTER
Caller: Catracho Sahu    Relationship: Self    Best call back number: [phone]     What is the medical concern/diagnosis: WEIGHT LOSS    What specialty or service is being requested: WEIGHT  LOSS  What is the provider, practice or medical service name: UNKNOWN  What is the office location:[facility]  What is the office phone number:  Any additional details: NAME OF WEIGHT LOSS SURGEON THE DOES NON SURGICAL WEIGHT LOSS

## 2023-04-04 NOTE — PROGRESS NOTES
Subjective:     Encounter Date:04/13/2023      Patient ID: Catracho Sahu is a 52 y.o.  white male from [place], [place],  for [place].     REFERRING PROVIDER: Ronit Mitchell PA-C   SLEEP PROVIDER: In [place], [place]     Chief Complaint:   Chief Complaint   Patient presents with   • Peripheral Edema    • Essential hypertension     Problem List:  1. Dyspnea on exertion  a. Remote stress test in the patient's 30s; negative per patient-data deficit  b. CCS class I atypical chest discomfort/NYHA class II-III dyspnea on exertion symptoms April 2023 with normal ECG March 2023  2. Hypertension  3. Hyperlipidemia  4. Bilateral lower extremity edema with normal lower extremity venous duplex March 2023  5. Obstructive sleep apnea with remote UP3 surgery, intolerant to CPAP, upcoming sleep consultation March 2022, April 2023  6. Morbid obesity: BMI 72.34  7. Family history of early CAD with multiple remote family members passing away in their 40s  8. Surgical history: UP3    No Known Allergies    Current Outpatient Medications   Medication Instructions   • bisoprolol (ZEBETA) 2.5 mg, Oral, Nightly   • hydroCHLOROthiazide (HYDRODIURIL) 25 mg, Oral, Daily   • losartan (COZAAR) 100 mg, Oral, Daily   • omeprazole (PRILOSEC) 20 mg, Oral, Daily   • torsemide (DEMADEX) 5 mg, Oral, Daily PRN         HISTORY OF PRESENT ILLNESS:  The patient is here for 1 year follow-up.  At his last appointment he was provided with torsemide 5 mg daily as needed for increased shortness of breath/edema.  At that time he was not interested in bariatric surgery.  He was seen in [facility] ED March 2023 for lower extremity edema and had a normal lower extremity venous duplex.  He had an echocardiogram that was ordered in 2022 but it has not yet been performed.  He feels like his shortness of breath is worsening over the past year.  He is not interested in bariatric surgery because his insurance will not  "cover it.  He has some lower extremity edema and felt like the Lasix he had helped.  He occasionally will have some right-sided chest pains but denies any palpitations, presyncope, or syncope.  Occasionally he will have some lightheadedness upon standing.  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  He snores and has frequent nocturnal awakening and is planning on following up with his sleep physician.  He was intolerant to CPAP.  He does not have a blood pressure monitor at home.  His ECG was normal in the ED March 2023.        ROS   All other systems reviewed and otherwise negative.    Procedures       Objective:       Vitals:    04/13/23 1113 04/13/23 1301 04/13/23 1302   BP: 151/84 140/95 120/95   BP Location: Left arm Left arm Left arm   Patient Position: Sitting Sitting Standing   Cuff Size: Large Adult     Pulse: 101 93    SpO2: 93%     Weight: (!) 242 kg (533 lb 6.4 oz)     Height: 182.9 cm (72\")     140/95 HR 93 left arm sitting, 120/95 left arm standing  Body mass index is 72.34 kg/m².  Last weight March 2022 was 493 pounds  Constitutional:       Appearance: Healthy appearance. Not in distress.   Neck:      Vascular: No JVR. JVD normal.   Pulmonary:      Effort: Pulmonary effort is normal.      Breath sounds: Decreased breath sounds present. No wheezing. No rhonchi. No rales.   Chest:      Chest wall: Not tender to palpatation.   Cardiovascular:      PMI at left midclavicular line. Normal rate. Regular rhythm. Normal S1. Normal S2.      Murmurs: There is no murmur.      No gallop. No click. No rub.   Pulses:     Dorsalis pedis: 1+ bilaterally.     Posterior tibial: 1+ bilaterally.  Edema:     Peripheral edema absent.   Abdominal:      General: Bowel sounds are normal.      Palpations: Abdomen is soft.      Tenderness: There is no abdominal tenderness.   Musculoskeletal: Normal range of motion.         General: No tenderness. Skin:     General: Skin is warm and dry. "   Neurological:      General: No focal deficit present.      Mental Status: Alert and oriented to person, place and time.           Lab Review:   Lab Results   Component Value Date    GLUCOSE 95 03/30/2023    BUN 14 03/30/2023    CREATININE 1.50 03/30/2023    EGFRIFNONA 54 10/28/2021    EGFRIFAFRI >60 10/28/2021    BCR 11.5 03/30/2023    CO2 27.0 03/30/2023    CALCIUM 8.7 03/30/2023    PROTENTOTREF 7.2 10/14/2022    ALBUMIN 3.8 03/30/2023    LABIL2 1.4 10/14/2022    AST 17 03/30/2023    ALT 13 03/30/2023       Lab Results   Component Value Date    WBC 6.60 03/30/2023    HGB 15.2 03/30/2023    HCT 46.9 03/30/2023    MCV 87.2 03/30/2023     03/30/2023       Lab Results   Component Value Date    HGBA1C 5.70 (H) 07/17/2018       Lab Results   Component Value Date    TSH 1.280 10/14/2022       Lab Results   Component Value Date    CHOL 145 07/17/2018     Lab Results   Component Value Date    TRIG 96 07/17/2018     Lab Results   Component Value Date    HDL 46 07/17/2018     Lab Results   Component Value Date    LDL 91 07/17/2018   proBNP 48.7 on 3/30/2023  Advance Care Planning   ACP discussion was held with the patient during this visit. Patient does not have an advance directive, declines further assistance.             Assessment:     Patient with fair/suboptimal functional status on limited activity.  The patient feels like his shortness of breath is worsening over the past year so I will screen him with a cardiac PET to rule out focal obstructive CAD and order an echocardiogram to assess heart structure/function.  I will provide him with torsemide 5 mg daily as needed for increased edema.  I feel that the patient would benefit from bariatric surgery but the patient says that his insurance will not pay for it.  He will follow-up with his sleep physician soon for TOREY/snoring.  His ECG was normal in March 2023.       Diagnosis Plan   1. Shortness of breath on exertion  Stress Test With Pet Myocardial Perfusion     Adult Transthoracic Echo Complete W/ Cont if Necessary Per Protocol      2. Essential hypertension   Start bisoprolol 2.5 mg nightly      3. Morbid obesity with BMI of 60.0-69.9, adult   Physical activity as tolerated, heart healthy diet      4. Pre-diabetes   No new labs to review, continue heart healthy diet and physical activity as tolerated      5. TOREY (obstructive sleep apnea)    He will follow-up with his sleep physician soon for TOREY/snoring.        6. Chest pain, atypical  Stress Test With Pet Myocardial Perfusion    Adult Transthoracic Echo Complete W/ Cont if Necessary Per Protocol             Plan:         1. Patient to continue current medications and close follow up with the above providers.  2. Tentative cardiology follow up in October 2023 or patient may return sooner PRN.  3. Reschedule echocardiogram  4. Cardiac PET  5. Torsemide 5 mg daily as needed for increased edema  6. Bisoprolol 2.5 mg nightly      Electronically signed by FÉLIX Posey, 04/13/23, 1:07 PM EDT.

## 2023-04-13 ENCOUNTER — OFFICE VISIT (OUTPATIENT)
Dept: CARDIOLOGY | Facility: CLINIC | Age: 53
End: 2023-04-13
Payer: COMMERCIAL

## 2023-04-13 VITALS
OXYGEN SATURATION: 93 % | WEIGHT: 315 LBS | HEART RATE: 93 BPM | BODY MASS INDEX: 42.66 KG/M2 | DIASTOLIC BLOOD PRESSURE: 95 MMHG | HEIGHT: 72 IN | SYSTOLIC BLOOD PRESSURE: 120 MMHG

## 2023-04-13 DIAGNOSIS — R07.89 CHEST PAIN, ATYPICAL: ICD-10-CM

## 2023-04-13 DIAGNOSIS — I10 ESSENTIAL HYPERTENSION: ICD-10-CM

## 2023-04-13 DIAGNOSIS — E66.01 MORBID OBESITY WITH BMI OF 60.0-69.9, ADULT: ICD-10-CM

## 2023-04-13 DIAGNOSIS — R73.03 PRE-DIABETES: ICD-10-CM

## 2023-04-13 DIAGNOSIS — G47.33 OSA (OBSTRUCTIVE SLEEP APNEA): ICD-10-CM

## 2023-04-13 DIAGNOSIS — R06.02 SHORTNESS OF BREATH ON EXERTION: Primary | ICD-10-CM

## 2023-04-13 PROCEDURE — 99214 OFFICE O/P EST MOD 30 MIN: CPT | Performed by: NURSE PRACTITIONER

## 2023-04-13 RX ORDER — TORSEMIDE 5 MG/1
5 TABLET ORAL DAILY PRN
Qty: 30 TABLET | Refills: 5 | Status: SHIPPED | OUTPATIENT
Start: 2023-04-13

## 2023-04-13 RX ORDER — BISOPROLOL FUMARATE 5 MG/1
2.5 TABLET, FILM COATED ORAL NIGHTLY
Qty: 45 TABLET | Refills: 3 | Status: SHIPPED | OUTPATIENT
Start: 2023-04-13

## 2023-04-13 NOTE — LETTER
April 13, 2023     Ronit Mitchell PA-C  [address]  [facility] [address]    Patient: Catracho Sahu   YOB: 1970   Date of Visit: 4/13/2023       Dear Dr. Paula PA-C:    Thank you for referring Catracho Sahu to me for evaluation. Below are the relevant portions of my assessment and plan of care.    If you have questions, please do not hesitate to call me. I look forward to following Catracho along with you.         Sincerely,        FÉLIX Posey        CC: No Recipients    Hui Mcgregor APRN  04/13/23 1308  Signed      Subjective:     Encounter Date:04/13/2023     Patient ID: Catracho Sahu is a 52 y.o.  white male from [place], [place],  for [employer].     REFERRING PROVIDER: Ronit Mitchell PA-C   SLEEP PROVIDER: In [place], [place]     Chief Complaint:   Chief Complaint   Patient presents with   • Peripheral Edema    • Essential hypertension     Problem List:  1. Dyspnea on exertion  a. Remote stress test in the patient's 30s; negative per patient-data deficit  b. CCS class I atypical chest discomfort/NYHA class II-III dyspnea on exertion symptoms April 2023 with normal ECG March 2023  2. Hypertension  3. Hyperlipidemia  4. Bilateral lower extremity edema with normal lower extremity venous duplex March 2023  5. Obstructive sleep apnea with remote UP3 surgery, intolerant to CPAP, upcoming sleep consultation March 2022, April 2023  6. Morbid obesity: BMI 72.34  7. Family history of early CAD with multiple remote family members passing away in their 40s  8. Surgical history: UP3    No Known Allergies    Current Outpatient Medications   Medication Instructions   • bisoprolol (ZEBETA) 2.5 mg, Oral, Nightly   • hydroCHLOROthiazide (HYDRODIURIL) 25 mg, Oral, Daily   • losartan (COZAAR) 100 mg, Oral, Daily   • omeprazole (PRILOSEC) 20 mg, Oral, Daily   • torsemide (DEMADEX) 5 mg, Oral, Daily PRN         HISTORY OF PRESENT  "ILLNESS:  The patient is here for 1 year follow-up.  At his last appointment he was provided with torsemide 5 mg daily as needed for increased shortness of breath/edema.  At that time he was not interested in bariatric surgery.  He was seen in [facility] ED March 2023 for lower extremity edema and had a normal lower extremity venous duplex.  He had an echocardiogram that was ordered in 2022 but it has not yet been performed.  He feels like his shortness of breath is worsening over the past year.  He is not interested in bariatric surgery because his insurance will not cover it.  He has some lower extremity edema and felt like the Lasix he had helped.  He occasionally will have some right-sided chest pains but denies any palpitations, presyncope, or syncope.  Occasionally he will have some lightheadedness upon standing.  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  He snores and has frequent nocturnal awakening and is planning on following up with his sleep physician.  He was intolerant to CPAP.  He does not have a blood pressure monitor at home.  His ECG was normal in the ED March 2023.        ROS   All other systems reviewed and otherwise negative.    Procedures      Objective:      Vitals:    04/13/23 1113 04/13/23 1301 04/13/23 1302   BP: 151/84 140/95 120/95   BP Location: Left arm Left arm Left arm   Patient Position: Sitting Sitting Standing   Cuff Size: Large Adult     Pulse: 101 93    SpO2: 93%     Weight: (!) 242 kg (533 lb 6.4 oz)     Height: 182.9 cm (72\")     140/95 HR 93 left arm sitting, 120/95 left arm standing  Body mass index is 72.34 kg/m².  Last weight March 2022 was 493 pounds  Constitutional:       Appearance: Healthy appearance. Not in distress.   Neck:      Vascular: No JVR. JVD normal.   Pulmonary:      Effort: Pulmonary effort is normal.      Breath sounds: Decreased breath sounds present. No wheezing. No rhonchi. No rales.   Chest:      Chest wall: Not tender to " palpatation.   Cardiovascular:      PMI at left midclavicular line. Normal rate. Regular rhythm. Normal S1. Normal S2.      Murmurs: There is no murmur.      No gallop. No click. No rub.   Pulses:     Dorsalis pedis: 1+ bilaterally.     Posterior tibial: 1+ bilaterally.  Edema:     Peripheral edema absent.   Abdominal:      General: Bowel sounds are normal.      Palpations: Abdomen is soft.      Tenderness: There is no abdominal tenderness.   Musculoskeletal: Normal range of motion.         General: No tenderness. Skin:     General: Skin is warm and dry.   Neurological:      General: No focal deficit present.      Mental Status: Alert and oriented to person, place and time.           Lab Review:   Lab Results   Component Value Date    GLUCOSE 95 03/30/2023    BUN 14 03/30/2023    CREATININE 1.50 03/30/2023    EGFRIFNONA 54 10/28/2021    EGFRIFAFRI >60 10/28/2021    BCR 11.5 03/30/2023    CO2 27.0 03/30/2023    CALCIUM 8.7 03/30/2023    PROTENTOTREF 7.2 10/14/2022    ALBUMIN 3.8 03/30/2023    LABIL2 1.4 10/14/2022    AST 17 03/30/2023    ALT 13 03/30/2023       Lab Results   Component Value Date    WBC 6.60 03/30/2023    HGB 15.2 03/30/2023    HCT 46.9 03/30/2023    MCV 87.2 03/30/2023     03/30/2023       Lab Results   Component Value Date    HGBA1C 5.70 (H) 07/17/2018       Lab Results   Component Value Date    TSH 1.280 10/14/2022       Lab Results   Component Value Date    CHOL 145 07/17/2018     Lab Results   Component Value Date    TRIG 96 07/17/2018     Lab Results   Component Value Date    HDL 46 07/17/2018     Lab Results   Component Value Date    LDL 91 07/17/2018   proBNP 48.7 on 3/30/2023  Advance Care Planning   ACP discussion was held with the patient during this visit. Patient does not have an advance directive, declines further assistance.           Assessment:    Patient with fair/suboptimal functional status on limited activity.  The patient feels like his shortness of breath is worsening  over the past year so I will screen him with a cardiac PET to rule out focal obstructive CAD and order an echocardiogram to assess heart structure/function.  I will provide him with torsemide 5 mg daily as needed for increased edema.  I feel that the patient would benefit from bariatric surgery but the patient says that his insurance will not pay for it.  He will follow-up with his sleep physician soon for TOREY/snoring.  His ECG was normal in March 2023.       Diagnosis Plan   1. Shortness of breath on exertion  Stress Test With Pet Myocardial Perfusion    Adult Transthoracic Echo Complete W/ Cont if Necessary Per Protocol      2. Essential hypertension   Start bisoprolol 2.5 mg nightly      3. Morbid obesity with BMI of 60.0-69.9, adult   Physical activity as tolerated, heart healthy diet      4. Pre-diabetes   No new labs to review, continue heart healthy diet and physical activity as tolerated      5. TOREY (obstructive sleep apnea)    He will follow-up with his sleep physician soon for TOREY/snoring.        6. Chest pain, atypical  Stress Test With Pet Myocardial Perfusion    Adult Transthoracic Echo Complete W/ Cont if Necessary Per Protocol            Plan:        1. Patient to continue current medications and close follow up with the above providers.  2. Tentative cardiology follow up in October 2023 or patient may return sooner PRN.  3. Reschedule echocardiogram  4. Cardiac PET  5. Torsemide 5 mg daily as needed for increased edema  6. Bisoprolol 2.5 mg nightly     Electronically signed by FÉLIX Posey, 04/13/23, 1:07 PM EDT.

## 2023-08-14 ENCOUNTER — TELEPHONE (OUTPATIENT)
Dept: FAMILY MEDICINE CLINIC | Facility: CLINIC | Age: 53
End: 2023-08-14

## 2023-08-14 NOTE — TELEPHONE ENCOUNTER
Caller: Catracho Sahu     Relationship: [unfilled]     Best call back number:[phone]    What is your medical concern? PT CALLED STATED THAT HE HAS LEG KNEE ISSUES WIH WEIGHT ISSUES HAVING ISSUES WITH WALKING AND WOULD LIKE TO LOOK INTO REQUESTING  HANDICAP STICKER.

## 2023-08-15 DIAGNOSIS — G89.29 CHRONIC PAIN OF BOTH KNEES: Primary | ICD-10-CM

## 2023-08-15 DIAGNOSIS — M25.561 CHRONIC PAIN OF BOTH KNEES: Primary | ICD-10-CM

## 2023-08-15 DIAGNOSIS — M25.562 CHRONIC PAIN OF BOTH KNEES: Primary | ICD-10-CM

## 2023-08-17 ENCOUNTER — TELEPHONE (OUTPATIENT)
Dept: ORTHOPEDIC SURGERY | Facility: CLINIC | Age: 53
End: 2023-08-17

## 2023-08-17 NOTE — TELEPHONE ENCOUNTER
Caller: Catracho Sahu    Relationship: Self    Best call back number: [phone]    Who is your current provider: DR. LEANDER WATSON    Who would you like your new provider to be: DR. ROMAN GONCALVES    What are your reasons for transferring care: DOESN'T WANT TO SEE DR. WATSON ANY LONGER